# Patient Record
Sex: FEMALE
[De-identification: names, ages, dates, MRNs, and addresses within clinical notes are randomized per-mention and may not be internally consistent; named-entity substitution may affect disease eponyms.]

---

## 2017-01-12 ENCOUNTER — APPOINTMENT (OUTPATIENT)
Dept: ENDOCRINOLOGY | Facility: CLINIC | Age: 42
End: 2017-01-12

## 2017-01-12 VITALS
HEIGHT: 59 IN | OXYGEN SATURATION: 98 % | SYSTOLIC BLOOD PRESSURE: 102 MMHG | WEIGHT: 100 LBS | DIASTOLIC BLOOD PRESSURE: 64 MMHG | BODY MASS INDEX: 20.16 KG/M2 | HEART RATE: 85 BPM

## 2017-01-12 DIAGNOSIS — M79.1 MYALGIA: ICD-10-CM

## 2017-01-13 LAB
ALBUMIN SERPL ELPH-MCNC: 4.8 G/DL
ALP BLD-CCNC: 67 U/L
ALT SERPL-CCNC: 13 U/L
ANION GAP SERPL CALC-SCNC: 17 MMOL/L
AST SERPL-CCNC: 16 U/L
BILIRUB SERPL-MCNC: 0.4 MG/DL
BUN SERPL-MCNC: 15 MG/DL
CALCIUM SERPL-MCNC: 9.6 MG/DL
CHLORIDE SERPL-SCNC: 104 MMOL/L
CO2 SERPL-SCNC: 20 MMOL/L
CREAT SERPL-MCNC: 0.66 MG/DL
DSDNA AB SER-ACNC: <12 IU/ML
GLUCOSE SERPL-MCNC: 87 MG/DL
POTASSIUM SERPL-SCNC: 4.7 MMOL/L
PROT SERPL-MCNC: 8 G/DL
SODIUM SERPL-SCNC: 141 MMOL/L
T4 FREE SERPL-MCNC: 1.2 NG/DL
TSH SERPL-ACNC: 0.72 UIU/ML

## 2017-01-16 LAB
ANA PAT FLD IF-IMP: ABNORMAL
ANA SER IF-ACNC: ABNORMAL

## 2017-02-07 ENCOUNTER — MEDICATION RENEWAL (OUTPATIENT)
Age: 42
End: 2017-02-07

## 2017-03-02 ENCOUNTER — RX RENEWAL (OUTPATIENT)
Age: 42
End: 2017-03-02

## 2017-03-09 ENCOUNTER — APPOINTMENT (OUTPATIENT)
Dept: NEUROLOGY | Facility: CLINIC | Age: 42
End: 2017-03-09

## 2017-03-09 VITALS
DIASTOLIC BLOOD PRESSURE: 74 MMHG | SYSTOLIC BLOOD PRESSURE: 110 MMHG | HEIGHT: 59 IN | BODY MASS INDEX: 20.16 KG/M2 | HEART RATE: 64 BPM | WEIGHT: 100 LBS
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
## 2017-03-09 DIAGNOSIS — G43.009 MIGRAINE W/OUT AURA, NOT INTRACTABLE, W/OUT STATUS MIGRAINOSUS: ICD-10-CM

## 2017-03-09 DIAGNOSIS — M26.629 ARTHRALGIA OF TEMPOROMANDIBULAR JOINT,: ICD-10-CM
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
## 2017-03-10 PROBLEM — G43.009 COMMON MIGRAINE WITHOUT AURA: Status: ACTIVE | Noted: 2017-03-10

## 2017-03-10 PROBLEM — M26.629 TMJ TENDERNESS: Status: ACTIVE | Noted: 2017-03-10

## 2017-03-23 ENCOUNTER — APPOINTMENT (OUTPATIENT)
Dept: INTERNAL MEDICINE | Facility: CLINIC | Age: 42
End: 2017-03-23

## 2017-03-23 ENCOUNTER — LABORATORY RESULT (OUTPATIENT)
Age: 42
End: 2017-03-23

## 2017-03-23 VITALS
HEART RATE: 72 BPM | HEIGHT: 59 IN | SYSTOLIC BLOOD PRESSURE: 100 MMHG | DIASTOLIC BLOOD PRESSURE: 70 MMHG | BODY MASS INDEX: 20.16 KG/M2 | WEIGHT: 100 LBS | TEMPERATURE: 98.6 F

## 2017-03-23 DIAGNOSIS — Z83.2 FAMILY HISTORY OF DISEASES OF THE BLOOD AND BLOOD-FORMING ORGANS AND CERTAIN DISORDERS INVOLVING THE IMMUNE MECHANISM: ICD-10-CM

## 2017-03-24 LAB
25(OH)D3 SERPL-MCNC: 52.7 NG/ML
CHOLEST SERPL-MCNC: 233 MG/DL
CHOLEST/HDLC SERPL: 3.5 RATIO
HBA1C MFR BLD HPLC: 5.3 %
HDLC SERPL-MCNC: 67 MG/DL
LDLC SERPL CALC-MCNC: 155 MG/DL
TRIGL SERPL-MCNC: 53 MG/DL

## 2017-03-25 LAB
ANA PAT FLD IF-IMP: ABNORMAL
ANA SER IF-ACNC: ABNORMAL
CARDIOLIPIN AB SER IA-ACNC: NEGATIVE

## 2017-03-28 LAB — S PYO AG SPEC QL IA: NEGATIVE

## 2017-04-06 ENCOUNTER — APPOINTMENT (OUTPATIENT)
Dept: NEUROLOGY | Facility: CLINIC | Age: 42
End: 2017-04-06

## 2017-04-26 ENCOUNTER — MEDICATION RENEWAL (OUTPATIENT)
Age: 42
End: 2017-04-26

## 2017-05-20 ENCOUNTER — RX RENEWAL (OUTPATIENT)
Age: 42
End: 2017-05-20

## 2017-05-23 ENCOUNTER — RX RENEWAL (OUTPATIENT)
Age: 42
End: 2017-05-23

## 2017-06-09 ENCOUNTER — MEDICATION RENEWAL (OUTPATIENT)
Age: 42
End: 2017-06-09

## 2017-06-09 DIAGNOSIS — J30.2 OTHER SEASONAL ALLERGIC RHINITIS: ICD-10-CM

## 2017-07-13 ENCOUNTER — APPOINTMENT (OUTPATIENT)
Dept: ENDOCRINOLOGY | Facility: CLINIC | Age: 42
End: 2017-07-13

## 2017-10-05 ENCOUNTER — APPOINTMENT (OUTPATIENT)
Dept: ENDOCRINOLOGY | Facility: CLINIC | Age: 42
End: 2017-10-05
Payer: COMMERCIAL

## 2017-10-05 ENCOUNTER — LABORATORY RESULT (OUTPATIENT)
Age: 42
End: 2017-10-05

## 2017-10-05 VITALS
WEIGHT: 102 LBS | OXYGEN SATURATION: 97 % | SYSTOLIC BLOOD PRESSURE: 100 MMHG | DIASTOLIC BLOOD PRESSURE: 62 MMHG | HEART RATE: 96 BPM | BODY MASS INDEX: 20.56 KG/M2 | HEIGHT: 59 IN

## 2017-10-05 DIAGNOSIS — E05.90 THYROTOXICOSIS, UNSPECIFIED W/OUT THYROTOXIC CRISIS OR STORM: ICD-10-CM

## 2017-10-05 PROCEDURE — 99213 OFFICE O/P EST LOW 20 MIN: CPT

## 2017-10-06 LAB
THYROGLOB AB SERPL-ACNC: <20 IU/ML
THYROPEROXIDASE AB SERPL IA-ACNC: <10 IU/ML

## 2017-10-12 LAB — TSI ACT/NOR SER: 213 %

## 2018-01-16 ENCOUNTER — RX RENEWAL (OUTPATIENT)
Age: 43
End: 2018-01-16

## 2018-01-17 ENCOUNTER — RX RENEWAL (OUTPATIENT)
Age: 43
End: 2018-01-17

## 2018-01-18 ENCOUNTER — MEDICATION RENEWAL (OUTPATIENT)
Age: 43
End: 2018-01-18

## 2018-01-19 ENCOUNTER — MEDICATION RENEWAL (OUTPATIENT)
Age: 43
End: 2018-01-19

## 2018-02-14 ENCOUNTER — APPOINTMENT (OUTPATIENT)
Dept: COLORECTAL SURGERY | Facility: CLINIC | Age: 43
End: 2018-02-14
Payer: COMMERCIAL

## 2018-02-14 VITALS
DIASTOLIC BLOOD PRESSURE: 73 MMHG | BODY MASS INDEX: 20.16 KG/M2 | WEIGHT: 100 LBS | OXYGEN SATURATION: 99 % | HEIGHT: 59 IN | SYSTOLIC BLOOD PRESSURE: 106 MMHG | HEART RATE: 84 BPM | RESPIRATION RATE: 14 BRPM

## 2018-02-14 DIAGNOSIS — K64.9 UNSPECIFIED HEMORRHOIDS: ICD-10-CM

## 2018-02-14 DIAGNOSIS — K64.1 SECOND DEGREE HEMORRHOIDS: ICD-10-CM

## 2018-02-14 DIAGNOSIS — K64.0 FIRST DEGREE HEMORRHOIDS: ICD-10-CM

## 2018-02-14 PROCEDURE — 46600 DIAGNOSTIC ANOSCOPY SPX: CPT

## 2018-02-14 PROCEDURE — 99243 OFF/OP CNSLTJ NEW/EST LOW 30: CPT | Mod: 25

## 2018-02-14 RX ORDER — MONTELUKAST 10 MG/1
10 TABLET, FILM COATED ORAL
Qty: 90 | Refills: 0 | Status: DISCONTINUED | COMMUNITY
Start: 2017-06-09 | End: 2018-02-14

## 2018-02-14 RX ORDER — AZITHROMYCIN 250 MG/1
250 TABLET, FILM COATED ORAL
Qty: 6 | Refills: 0 | Status: DISCONTINUED | COMMUNITY
Start: 2017-03-25

## 2018-02-14 RX ORDER — DICLOFENAC SODIUM 50 MG/1
50 TABLET, DELAYED RELEASE ORAL
Qty: 20 | Refills: 0 | Status: DISCONTINUED | COMMUNITY
Start: 2018-01-19 | End: 2018-02-14

## 2018-02-14 RX ORDER — HYOSCYAMINE SULFATE 0.12 MG/1
0.12 TABLET, ORALLY DISINTEGRATING ORAL
Qty: 30 | Refills: 0 | Status: DISCONTINUED | COMMUNITY
Start: 2017-07-31

## 2018-02-14 RX ORDER — DICLOFENAC SODIUM 10 MG/G
1 GEL TOPICAL
Qty: 3 | Refills: 2 | Status: DISCONTINUED | COMMUNITY
Start: 2018-01-18 | End: 2018-02-14

## 2018-02-14 RX ORDER — HYOSCYAMINE SULFATE 0.12 MG/1
0.12 TABLET SUBLINGUAL 4 TIMES DAILY
Qty: 15 | Refills: 0 | Status: DISCONTINUED | COMMUNITY
End: 2018-02-14

## 2018-03-28 ENCOUNTER — APPOINTMENT (OUTPATIENT)
Dept: INTERNAL MEDICINE | Facility: CLINIC | Age: 43
End: 2018-03-28
Payer: COMMERCIAL

## 2018-03-28 VITALS
HEIGHT: 59 IN | DIASTOLIC BLOOD PRESSURE: 64 MMHG | SYSTOLIC BLOOD PRESSURE: 100 MMHG | HEART RATE: 78 BPM | WEIGHT: 98 LBS | BODY MASS INDEX: 19.76 KG/M2 | OXYGEN SATURATION: 98 % | TEMPERATURE: 98.2 F

## 2018-03-28 DIAGNOSIS — Z86.39 PERSONAL HISTORY OF OTHER ENDOCRINE, NUTRITIONAL AND METABOLIC DISEASE: ICD-10-CM

## 2018-03-28 DIAGNOSIS — E03.8 OTHER SPECIFIED HYPOTHYROIDISM: ICD-10-CM
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
## 2018-03-28 PROCEDURE — 99396 PREV VISIT EST AGE 40-64: CPT | Mod: 25

## 2018-03-28 PROCEDURE — 36415 COLL VENOUS BLD VENIPUNCTURE: CPT

## 2018-03-29 ENCOUNTER — MEDICATION RENEWAL (OUTPATIENT)
Age: 43
End: 2018-03-29

## 2018-03-29 LAB
ALBUMIN SERPL ELPH-MCNC: 4.8 G/DL
ALP BLD-CCNC: 67 U/L
ALT SERPL-CCNC: 23 U/L
ANION GAP SERPL CALC-SCNC: 14 MMOL/L
AST SERPL-CCNC: 28 U/L
BASOPHILS # BLD AUTO: 0.01 K/UL
BASOPHILS NFR BLD AUTO: 0.2 %
BILIRUB SERPL-MCNC: 0.6 MG/DL
BUN SERPL-MCNC: 13 MG/DL
CALCIUM SERPL-MCNC: 9.5 MG/DL
CHLORIDE SERPL-SCNC: 103 MMOL/L
CHOLEST SERPL-MCNC: 264 MG/DL
CHOLEST/HDLC SERPL: 4 RATIO
CO2 SERPL-SCNC: 23 MMOL/L
CREAT SERPL-MCNC: 0.76 MG/DL
EOSINOPHIL # BLD AUTO: 0.07 K/UL
EOSINOPHIL NFR BLD AUTO: 1.2 %
GLUCOSE SERPL-MCNC: 79 MG/DL
HBA1C MFR BLD HPLC: 5.2 %
HCT VFR BLD CALC: 43.7 %
HDLC SERPL-MCNC: 66 MG/DL
HGB BLD-MCNC: 14 G/DL
IMM GRANULOCYTES NFR BLD AUTO: 0.2 %
LDLC SERPL CALC-MCNC: 176 MG/DL
LYMPHOCYTES # BLD AUTO: 2.2 K/UL
LYMPHOCYTES NFR BLD AUTO: 36.8 %
MAN DIFF?: NORMAL
MCHC RBC-ENTMCNC: 29 PG
MCHC RBC-ENTMCNC: 32 GM/DL
MCV RBC AUTO: 90.5 FL
MONOCYTES # BLD AUTO: 0.4 K/UL
MONOCYTES NFR BLD AUTO: 6.7 %
NEUTROPHILS # BLD AUTO: 3.29 K/UL
NEUTROPHILS NFR BLD AUTO: 54.9 %
PLATELET # BLD AUTO: 313 K/UL
POTASSIUM SERPL-SCNC: 4.7 MMOL/L
PROT SERPL-MCNC: 7.9 G/DL
RBC # BLD: 4.83 M/UL
RBC # FLD: 12.2 %
SODIUM SERPL-SCNC: 140 MMOL/L
TRIGL SERPL-MCNC: 109 MG/DL
TSH SERPL-ACNC: 0.66 UIU/ML
WBC # FLD AUTO: 5.98 K/UL

## 2018-03-30 ENCOUNTER — MEDICATION RENEWAL (OUTPATIENT)
Age: 43
End: 2018-03-30

## 2018-04-16 ENCOUNTER — MEDICATION RENEWAL (OUTPATIENT)
Age: 43
End: 2018-04-16

## 2018-05-25 ENCOUNTER — MEDICATION RENEWAL (OUTPATIENT)
Age: 43
End: 2018-05-25

## 2018-05-29 ENCOUNTER — RX RENEWAL (OUTPATIENT)
Age: 43
End: 2018-05-29

## 2018-09-07 ENCOUNTER — RX RENEWAL (OUTPATIENT)
Age: 43
End: 2018-09-07

## 2018-10-11 ENCOUNTER — RX RENEWAL (OUTPATIENT)
Age: 43
End: 2018-10-11

## 2018-11-20 ENCOUNTER — RX RENEWAL (OUTPATIENT)
Age: 43
End: 2018-11-20

## 2018-12-26 ENCOUNTER — RX RENEWAL (OUTPATIENT)
Age: 43
End: 2018-12-26

## 2019-03-27 ENCOUNTER — RX RENEWAL (OUTPATIENT)
Age: 44
End: 2019-03-27

## 2019-03-27 ENCOUNTER — MEDICATION RENEWAL (OUTPATIENT)
Age: 44
End: 2019-03-27

## 2019-03-28 ENCOUNTER — OTHER (OUTPATIENT)
Age: 44
End: 2019-03-28

## 2019-04-10 ENCOUNTER — APPOINTMENT (OUTPATIENT)
Dept: INTERNAL MEDICINE | Facility: CLINIC | Age: 44
End: 2019-04-10
Payer: COMMERCIAL

## 2019-04-10 VITALS
BODY MASS INDEX: 19.96 KG/M2 | WEIGHT: 99 LBS | OXYGEN SATURATION: 98 % | HEIGHT: 59 IN | DIASTOLIC BLOOD PRESSURE: 78 MMHG | TEMPERATURE: 97.9 F | SYSTOLIC BLOOD PRESSURE: 104 MMHG | HEART RATE: 91 BPM

## 2019-04-10 PROCEDURE — 99396 PREV VISIT EST AGE 40-64: CPT | Mod: 25

## 2019-04-10 PROCEDURE — 36415 COLL VENOUS BLD VENIPUNCTURE: CPT

## 2019-04-10 NOTE — ASSESSMENT
[FreeTextEntry1] : # CPE \par \par - diet / exercise discussed \par - check lipid and BG \par - Needs breast US , PAP scheduled \par - UTD Tdap\par \par # migraines \par - ctr current management \par \par # GERD \par - controlled \par - takes prn PPI \par - Follows w/ GI - Dr Perkins \par \par # myalgia \par - check ESR TSH CK \par \par $ Graves Disease \par - check TSH / free t4 \par

## 2019-04-10 NOTE — PHYSICAL EXAM
[No Acute Distress] : no acute distress [Well Nourished] : well nourished [Well Developed] : well developed [Well-Appearing] : well-appearing [Normal Sclera/Conjunctiva] : normal sclera/conjunctiva [PERRL] : pupils equal round and reactive to light [EOMI] : extraocular movements intact [Normal Outer Ear/Nose] : the outer ears and nose were normal in appearance [Normal Oropharynx] : the oropharynx was normal [No JVD] : no jugular venous distention [Supple] : supple [No Lymphadenopathy] : no lymphadenopathy [No Respiratory Distress] : no respiratory distress  [Clear to Auscultation] : lungs were clear to auscultation bilaterally [No Accessory Muscle Use] : no accessory muscle use [Normal Rate] : normal rate  [Regular Rhythm] : with a regular rhythm [Normal S1, S2] : normal S1 and S2 [No Varicosities] : no varicosities [No Edema] : there was no peripheral edema [No Extremity Clubbing/Cyanosis] : no extremity clubbing/cyanosis [Soft] : abdomen soft [Non Tender] : non-tender [No HSM] : no HSM [Normal Bowel Sounds] : normal bowel sounds [Normal Supraclavicular Nodes] : no supraclavicular lymphadenopathy [Normal Posterior Cervical Nodes] : no posterior cervical lymphadenopathy [Normal Anterior Cervical Nodes] : no anterior cervical lymphadenopathy [No CVA Tenderness] : no CVA  tenderness [No Spinal Tenderness] : no spinal tenderness [No Joint Swelling] : no joint swelling [Grossly Normal Strength/Tone] : grossly normal strength/tone [No Rash] : no rash [No Skin Lesions] : no skin lesions [Normal Gait] : normal gait [Coordination Grossly Intact] : coordination grossly intact [No Focal Deficits] : no focal deficits [Speech Grossly Normal] : speech grossly normal [Memory Grossly Normal] : memory grossly normal [Normal Mood] : the mood was normal [Normal Insight/Judgement] : insight and judgment were intact

## 2019-04-10 NOTE — HISTORY OF PRESENT ILLNESS
[FreeTextEntry1] : CPE \par \par  ch neck pain - she reports 2 remote injuries , no weakness in the arms\par she has migraines that seem to be increasing in severity - maxalt ODT works better \par \par (+) stress \par \par h/o Grave's - not on any meds , follows with endo - Dr Rooney \par would like to see rheum bc myalgias and fatigue

## 2019-04-10 NOTE — HEALTH RISK ASSESSMENT
[Good] : ~his/her~  mood as  good [] : No [0] : 2) Feeling down, depressed, or hopeless: Not at all (0) [de-identified] : soc [de-identified] : no  [Change in mental status noted] : No change in mental status noted [de-identified] : regular  [None] : None [With Family] : lives with family [] :  [Sexually Active] : sexually active [Employed] : employed [Feels Safe at Home] : Feels safe at home [Reports changes in vision] : Reports changes in vision [Reports changes in dental health] : Reports changes in dental health [Carbon Monoxide Detector] : carbon monoxide detector [Smoke Detector] : smoke detector [MammogramDate] : 2019 [Safety elements used in home] : safety elements used in home [Seat Belt] :  uses seat belt [PapSmearDate] : due-

## 2019-04-11 LAB
25(OH)D3 SERPL-MCNC: 71.6 NG/ML
ALBUMIN SERPL ELPH-MCNC: 5.1 G/DL
ALP BLD-CCNC: 68 U/L
ALT SERPL-CCNC: 21 U/L
ANION GAP SERPL CALC-SCNC: 13 MMOL/L
AST SERPL-CCNC: 24 U/L
BASOPHILS # BLD AUTO: 0.03 K/UL
BASOPHILS NFR BLD AUTO: 0.4 %
BILIRUB SERPL-MCNC: 0.2 MG/DL
BUN SERPL-MCNC: 17 MG/DL
CALCIUM SERPL-MCNC: 9.7 MG/DL
CHLORIDE SERPL-SCNC: 105 MMOL/L
CHOLEST SERPL-MCNC: 205 MG/DL
CHOLEST/HDLC SERPL: 3.2 RATIO
CO2 SERPL-SCNC: 22 MMOL/L
CREAT SERPL-MCNC: 0.66 MG/DL
EOSINOPHIL # BLD AUTO: 0.1 K/UL
EOSINOPHIL NFR BLD AUTO: 1.3 %
ESTIMATED AVERAGE GLUCOSE: 100 MG/DL
GLUCOSE SERPL-MCNC: 90 MG/DL
HBA1C MFR BLD HPLC: 5.1 %
HCT VFR BLD CALC: 44.7 %
HDLC SERPL-MCNC: 65 MG/DL
HGB BLD-MCNC: 14.3 G/DL
IMM GRANULOCYTES NFR BLD AUTO: 0.3 %
LDLC SERPL CALC-MCNC: 121 MG/DL
LYMPHOCYTES # BLD AUTO: 2.74 K/UL
LYMPHOCYTES NFR BLD AUTO: 36.4 %
MAN DIFF?: NORMAL
MCHC RBC-ENTMCNC: 29.1 PG
MCHC RBC-ENTMCNC: 32 GM/DL
MCV RBC AUTO: 90.9 FL
MONOCYTES # BLD AUTO: 0.46 K/UL
MONOCYTES NFR BLD AUTO: 6.1 %
NEUTROPHILS # BLD AUTO: 4.18 K/UL
NEUTROPHILS NFR BLD AUTO: 55.5 %
PLATELET # BLD AUTO: 313 K/UL
POTASSIUM SERPL-SCNC: 4.9 MMOL/L
PROT SERPL-MCNC: 8.2 G/DL
RBC # BLD: 4.92 M/UL
RBC # FLD: 11.9 %
SODIUM SERPL-SCNC: 140 MMOL/L
TRIGL SERPL-MCNC: 97 MG/DL
TSH SERPL-ACNC: 1.19 UIU/ML
WBC # FLD AUTO: 7.53 K/UL

## 2019-04-23 ENCOUNTER — MEDICATION RENEWAL (OUTPATIENT)
Age: 44
End: 2019-04-23

## 2019-05-23 ENCOUNTER — RX RENEWAL (OUTPATIENT)
Age: 44
End: 2019-05-23

## 2019-05-28 ENCOUNTER — MEDICATION RENEWAL (OUTPATIENT)
Age: 44
End: 2019-05-28

## 2019-07-12 ENCOUNTER — MEDICATION RENEWAL (OUTPATIENT)
Age: 44
End: 2019-07-12

## 2019-08-28 ENCOUNTER — RX RENEWAL (OUTPATIENT)
Age: 44
End: 2019-08-28

## 2019-08-29 ENCOUNTER — RX RENEWAL (OUTPATIENT)
Age: 44
End: 2019-08-29

## 2019-11-20 ENCOUNTER — CLINICAL ADVICE (OUTPATIENT)
Age: 44
End: 2019-11-20

## 2020-03-13 ENCOUNTER — APPOINTMENT (OUTPATIENT)
Dept: ENDOCRINOLOGY | Facility: CLINIC | Age: 45
End: 2020-03-13

## 2020-03-29 ENCOUNTER — TRANSCRIPTION ENCOUNTER (OUTPATIENT)
Age: 45
End: 2020-03-29

## 2020-03-31 ENCOUNTER — APPOINTMENT (OUTPATIENT)
Dept: DISASTER EMERGENCY | Facility: CLINIC | Age: 45
End: 2020-03-31

## 2020-04-04 ENCOUNTER — NON-APPOINTMENT (OUTPATIENT)
Age: 45
End: 2020-04-04

## 2020-04-06 DIAGNOSIS — Z20.828 CONTACT WITH AND (SUSPECTED) EXPOSURE TO OTHER VIRAL COMMUNICABLE DISEASES: ICD-10-CM

## 2020-04-14 ENCOUNTER — APPOINTMENT (OUTPATIENT)
Dept: INTERNAL MEDICINE | Facility: CLINIC | Age: 45
End: 2020-04-14

## 2020-07-22 ENCOUNTER — APPOINTMENT (OUTPATIENT)
Dept: INTERNAL MEDICINE | Facility: CLINIC | Age: 45
End: 2020-07-22
Payer: COMMERCIAL

## 2020-07-22 VITALS
DIASTOLIC BLOOD PRESSURE: 70 MMHG | SYSTOLIC BLOOD PRESSURE: 100 MMHG | TEMPERATURE: 99.1 F | HEIGHT: 59 IN | OXYGEN SATURATION: 98 % | WEIGHT: 100 LBS | HEART RATE: 97 BPM | BODY MASS INDEX: 20.16 KG/M2

## 2020-07-22 PROCEDURE — 99396 PREV VISIT EST AGE 40-64: CPT | Mod: 25

## 2020-07-22 PROCEDURE — 36415 COLL VENOUS BLD VENIPUNCTURE: CPT

## 2020-07-22 RX ORDER — PSEUDOEPHEDRINE HCL 30 MG
TABLET ORAL
Refills: 0 | Status: DISCONTINUED | COMMUNITY
End: 2020-07-22

## 2020-07-22 NOTE — HISTORY OF PRESENT ILLNESS
[FreeTextEntry1] : CPE \par \par  feels very tired and fatigued since she had the Covid infection \par she also has long standing GERD - never scoped - follows with Dr Perkins \par \par Endo - hyperthyroid - subclinical \par \par has been having  rash on the chest since she had the Covid \par itchy

## 2020-07-22 NOTE — HEALTH RISK ASSESSMENT
[] : No [No] : No [Good] : ~his/her~  mood as  good [0] : 1) Little interest or pleasure doing things: Not at all (0) [None] : None [] :  [Employed] : employed [With Family] : lives with family [Sexually Active] : sexually active [Feels Safe at Home] : Feels safe at home [Fully functional (bathing, dressing, toileting, transferring, walking, feeding)] : Fully functional (bathing, dressing, toileting, transferring, walking, feeding) [Fully functional (using the telephone, shopping, preparing meals, housekeeping, doing laundry, using] : Fully functional and needs no help or supervision to perform IADLs (using the telephone, shopping, preparing meals, housekeeping, doing laundry, using transportation, managing medications and managing finances) [Reports changes in hearing] : Reports no changes in hearing [Reports changes in vision] : Reports changes in vision [Reports changes in dental health] : Reports changes in dental health [PapSmearDate] : 2019 [MammogramDate] : 2019 [Smoke Detector] : smoke detector

## 2020-07-22 NOTE — ASSESSMENT
[FreeTextEntry1] : 1) CPE\par - diet /exercise discussed \par - check labs\par - mammo - ordered \par - UTD PAP / Tdap\par \par 2) check thyroid levels \par - US thyroid \par \par derm - has her own derm - declines referral\par \par GI f/u for ch GERD

## 2020-07-22 NOTE — REVIEW OF SYSTEMS
[Fatigue] : fatigue [Skin Rash] : skin rash [Negative] : Heme/Lymph [FreeTextEntry9] : ch neck pain

## 2020-07-23 LAB
25(OH)D3 SERPL-MCNC: 25.8 NG/ML
ALBUMIN SERPL ELPH-MCNC: 4.7 G/DL
ALP BLD-CCNC: 68 U/L
ALT SERPL-CCNC: 22 U/L
ANION GAP SERPL CALC-SCNC: 13 MMOL/L
AST SERPL-CCNC: 22 U/L
BASOPHILS # BLD AUTO: 0.05 K/UL
BASOPHILS NFR BLD AUTO: 0.6 %
BILIRUB SERPL-MCNC: 0.5 MG/DL
BUN SERPL-MCNC: 16 MG/DL
CALCIUM SERPL-MCNC: 9.6 MG/DL
CHLORIDE SERPL-SCNC: 105 MMOL/L
CHOLEST SERPL-MCNC: 213 MG/DL
CHOLEST/HDLC SERPL: 3.4 RATIO
CO2 SERPL-SCNC: 21 MMOL/L
CREAT SERPL-MCNC: 0.77 MG/DL
EOSINOPHIL # BLD AUTO: 0.08 K/UL
EOSINOPHIL NFR BLD AUTO: 0.9 %
ESTIMATED AVERAGE GLUCOSE: 100 MG/DL
GLUCOSE SERPL-MCNC: 86 MG/DL
HBA1C MFR BLD HPLC: 5.1 %
HCT VFR BLD CALC: 44.4 %
HDLC SERPL-MCNC: 63 MG/DL
HGB BLD-MCNC: 14.2 G/DL
IMM GRANULOCYTES NFR BLD AUTO: 0.1 %
LDLC SERPL CALC-MCNC: 131 MG/DL
LYMPHOCYTES # BLD AUTO: 2.19 K/UL
LYMPHOCYTES NFR BLD AUTO: 25.7 %
MAN DIFF?: NORMAL
MCHC RBC-ENTMCNC: 28.9 PG
MCHC RBC-ENTMCNC: 32 GM/DL
MCV RBC AUTO: 90.4 FL
MONOCYTES # BLD AUTO: 0.6 K/UL
MONOCYTES NFR BLD AUTO: 7 %
NEUTROPHILS # BLD AUTO: 5.59 K/UL
NEUTROPHILS NFR BLD AUTO: 65.7 %
PLATELET # BLD AUTO: 320 K/UL
POTASSIUM SERPL-SCNC: 4.5 MMOL/L
PROT SERPL-MCNC: 7.2 G/DL
RBC # BLD: 4.91 M/UL
RBC # FLD: 11.9 %
SODIUM SERPL-SCNC: 139 MMOL/L
TRIGL SERPL-MCNC: 91 MG/DL
TSH SERPL-ACNC: 1.03 UIU/ML
WBC # FLD AUTO: 8.52 K/UL

## 2020-09-15 ENCOUNTER — RX RENEWAL (OUTPATIENT)
Age: 45
End: 2020-09-15

## 2021-04-01 ENCOUNTER — APPOINTMENT (OUTPATIENT)
Dept: GASTROENTEROLOGY | Facility: CLINIC | Age: 46
End: 2021-04-01

## 2021-05-24 ENCOUNTER — NON-APPOINTMENT (OUTPATIENT)
Age: 46
End: 2021-05-24

## 2021-05-25 ENCOUNTER — RX RENEWAL (OUTPATIENT)
Age: 46
End: 2021-05-25

## 2021-05-26 ENCOUNTER — RX RENEWAL (OUTPATIENT)
Age: 46
End: 2021-05-26

## 2021-06-10 ENCOUNTER — LABORATORY RESULT (OUTPATIENT)
Age: 46
End: 2021-06-10

## 2021-06-10 ENCOUNTER — APPOINTMENT (OUTPATIENT)
Dept: GASTROENTEROLOGY | Facility: CLINIC | Age: 46
End: 2021-06-10
Payer: COMMERCIAL

## 2021-06-10 VITALS
WEIGHT: 102 LBS | SYSTOLIC BLOOD PRESSURE: 102 MMHG | BODY MASS INDEX: 20.56 KG/M2 | DIASTOLIC BLOOD PRESSURE: 68 MMHG | TEMPERATURE: 97.1 F | HEIGHT: 59 IN

## 2021-06-10 DIAGNOSIS — K58.0 IRRITABLE BOWEL SYNDROME WITH DIARRHEA: ICD-10-CM

## 2021-06-10 DIAGNOSIS — R14.0 ABDOMINAL DISTENSION (GASEOUS): ICD-10-CM

## 2021-06-10 DIAGNOSIS — K58.1 IRRITABLE BOWEL SYNDROME WITH CONSTIPATION: ICD-10-CM

## 2021-06-10 DIAGNOSIS — K21.9 GASTRO-ESOPHAGEAL REFLUX DISEASE W/OUT ESOPHAGITIS: ICD-10-CM

## 2021-06-10 PROCEDURE — 99214 OFFICE O/P EST MOD 30 MIN: CPT | Mod: 25

## 2021-06-10 PROCEDURE — 36415 COLL VENOUS BLD VENIPUNCTURE: CPT

## 2021-06-10 PROCEDURE — 99072 ADDL SUPL MATRL&STAF TM PHE: CPT

## 2021-06-10 RX ORDER — PANTOPRAZOLE 40 MG/1
40 TABLET, DELAYED RELEASE ORAL DAILY
Qty: 90 | Refills: 2 | Status: DISCONTINUED | COMMUNITY
Start: 2021-05-28 | End: 2021-06-10

## 2021-06-10 RX ORDER — MELOXICAM 7.5 MG/1
7.5 TABLET ORAL DAILY
Qty: 30 | Refills: 1 | Status: DISCONTINUED | COMMUNITY
Start: 2018-03-29 | End: 2021-06-10

## 2021-06-10 RX ORDER — LEVOCETIRIZINE DIHYDROCHLORIDE 5 MG/1
5 TABLET, FILM COATED ORAL
Refills: 0 | Status: DISCONTINUED | COMMUNITY
End: 2021-06-10

## 2021-06-10 RX ORDER — IBUPROFEN 200 MG/1
200 TABLET, FILM COATED ORAL
Refills: 0 | Status: DISCONTINUED | COMMUNITY
End: 2021-06-10

## 2021-06-10 RX ORDER — AZELASTINE HYDROCHLORIDE 137 UG/1
0.1 SPRAY, METERED NASAL TWICE DAILY
Qty: 1 | Refills: 0 | Status: DISCONTINUED | COMMUNITY
Start: 2019-07-12 | End: 2021-06-10

## 2021-06-10 RX ORDER — CLONAZEPAM 0.5 MG/1
0.5 TABLET ORAL
Qty: 30 | Refills: 0 | Status: DISCONTINUED | COMMUNITY
Start: 2017-03-23 | End: 2021-06-10

## 2021-06-10 NOTE — PHYSICAL EXAM
[General Appearance - Alert] : alert [General Appearance - In No Acute Distress] : in no acute distress [Sclera] : the sclera and conjunctiva were normal [PERRL With Normal Accommodation] : pupils were equal in size, round, and reactive to light [Extraocular Movements] : extraocular movements were intact [Outer Ear] : the ears and nose were normal in appearance [Oropharynx] : the oropharynx was normal [Neck Appearance] : the appearance of the neck was normal [Neck Cervical Mass (___cm)] : no neck mass was observed [Thyroid Diffuse Enlargement] : the thyroid was not enlarged [Jugular Venous Distention Increased] : there was no jugular-venous distention [Thyroid Nodule] : there were no palpable thyroid nodules [Auscultation Breath Sounds / Voice Sounds] : lungs were clear to auscultation bilaterally [Heart Rate And Rhythm] : heart rate was normal and rhythm regular [Heart Sounds] : normal S1 and S2 [Heart Sounds Gallop] : no gallops [Murmurs] : no murmurs [Heart Sounds Pericardial Friction Rub] : no pericardial rub [Full Pulse] : the pedal pulses are present [Edema] : there was no peripheral edema [Bowel Sounds] : normal bowel sounds [Abdomen Soft] : soft [Abdomen Tenderness] : non-tender [Abdomen Mass (___ Cm)] : no abdominal mass palpated [Cervical Lymph Nodes Enlarged Posterior Bilaterally] : posterior cervical [Cervical Lymph Nodes Enlarged Anterior Bilaterally] : anterior cervical [Supraclavicular Lymph Nodes Enlarged Bilaterally] : supraclavicular [Axillary Lymph Nodes Enlarged Bilaterally] : axillary [Femoral Lymph Nodes Enlarged Bilaterally] : femoral [Inguinal Lymph Nodes Enlarged Bilaterally] : inguinal [No CVA Tenderness] : no ~M costovertebral angle tenderness [No Spinal Tenderness] : no spinal tenderness [Abnormal Walk] : normal gait [Nail Clubbing] : no clubbing  or cyanosis of the fingernails [Musculoskeletal - Swelling] : no joint swelling seen [Motor Tone] : muscle strength and tone were normal [Skin Turgor] : normal skin turgor [Skin Color & Pigmentation] : normal skin color and pigmentation [] : no rash [Deep Tendon Reflexes (DTR)] : deep tendon reflexes were 2+ and symmetric [Sensation] : the sensory exam was normal to light touch and pinprick [No Focal Deficits] : no focal deficits [Oriented To Time, Place, And Person] : oriented to person, place, and time [Impaired Insight] : insight and judgment were intact [Affect] : the affect was normal

## 2021-06-10 NOTE — REVIEW OF SYSTEMS
[Diarrhea] : diarrhea [Negative] : Heme/Lymph [As Noted in HPI] : as noted in HPI [Arthralgias] : arthralgias [Joint Pain] : joint pain

## 2021-06-11 ENCOUNTER — RESULT REVIEW (OUTPATIENT)
Age: 46
End: 2021-06-11

## 2021-06-11 LAB
24R-OH-CALCIDIOL SERPL-MCNC: 90.8 PG/ML
25(OH)D3 SERPL-MCNC: 22.2 NG/ML
ALBUMIN SERPL ELPH-MCNC: 4.6 G/DL
ALP BLD-CCNC: 79 U/L
ALT SERPL-CCNC: 13 U/L
ANION GAP SERPL CALC-SCNC: 13 MMOL/L
AST SERPL-CCNC: 19 U/L
BILIRUB SERPL-MCNC: 0.3 MG/DL
BUN SERPL-MCNC: 16 MG/DL
CALCIUM SERPL-MCNC: 9.5 MG/DL
CHLORIDE SERPL-SCNC: 105 MMOL/L
CO2 SERPL-SCNC: 22 MMOL/L
CREAT SERPL-MCNC: 0.69 MG/DL
GLUCOSE SERPL-MCNC: 104 MG/DL
HBV SURFACE AB SER QL: REACTIVE
HBV SURFACE AG SER QL: NONREACTIVE
HCV AB SER QL: NONREACTIVE
HCV S/CO RATIO: 0.2 S/CO
HEPATITIS A IGG ANTIBODY: REACTIVE
POTASSIUM SERPL-SCNC: 4.6 MMOL/L
PROT SERPL-MCNC: 7.6 G/DL
SODIUM SERPL-SCNC: 140 MMOL/L
TSH SERPL-ACNC: 0.87 UIU/ML
VIT B12 SERPL-MCNC: 738 PG/ML

## 2021-06-14 LAB — CELIACPAN: NORMAL

## 2021-06-30 ENCOUNTER — NON-APPOINTMENT (OUTPATIENT)
Age: 46
End: 2021-06-30

## 2021-07-06 PROBLEM — K58.1 IRRITABLE BOWEL SYNDROME WITH CONSTIPATION: Status: RESOLVED | Noted: 2021-03-03 | Resolved: 2021-07-06

## 2021-07-06 PROBLEM — K58.0 IRRITABLE BOWEL SYNDROME WITH DIARRHEA: Status: RESOLVED | Noted: 2021-06-30 | Resolved: 2021-07-06

## 2021-07-06 PROBLEM — K58.0 IRRITABLE BOWEL SYNDROME WITH DIARRHEA: Status: ACTIVE | Noted: 2021-07-06

## 2021-07-06 NOTE — HISTORY OF PRESENT ILLNESS
[FreeTextEntry1] : Patient is a 45-year-old female with irritable bowel syndrome and cervical spine pain self-referred for recent looser bowel movements without any nocturnal symptoms.  She has self diagnosed lactose intolerance as milk or milk products causes diarrhea.  She uses Lactaid as needed.  She has GERD for which she uses pantoprazole and occasional Pepcid.  We discussed that in the near future we will likely proceed to endoscopy.  There is no rectal bleeding or family history colorectal cancer.  There is no known new medications.  She recently received her first COVID-19 vaccine.  No weight loss or anorexia.

## 2021-07-06 NOTE — ASSESSMENT
[FreeTextEntry1] : 45-year-old female with IBS-D with recent increase symptoms after oily foods fried foods predominantly.  There is no weight loss anorexia or rectal bleeding.  Her GERD is baseline without any dysphagia.  I advised her to continue with the Lactaid and avoiding lactose-containing products.  We will perform stool testing for fecal calprotectin, fecal elastase and PCR based gastrointestinal pathogens.  If the calprotectin is positive we will proceed to colonoscopy.  In the near future she will undergo endoscopy.  Lastly she will obtain an abdominal ultrasound at [facility].

## 2021-08-14 ENCOUNTER — APPOINTMENT (OUTPATIENT)
Dept: INTERNAL MEDICINE | Facility: CLINIC | Age: 46
End: 2021-08-14
Payer: COMMERCIAL

## 2021-08-14 VITALS
TEMPERATURE: 98.3 F | BODY MASS INDEX: 21.2 KG/M2 | SYSTOLIC BLOOD PRESSURE: 114 MMHG | OXYGEN SATURATION: 98 % | DIASTOLIC BLOOD PRESSURE: 70 MMHG | HEIGHT: 58 IN | WEIGHT: 101 LBS | HEART RATE: 72 BPM

## 2021-08-14 PROCEDURE — 99396 PREV VISIT EST AGE 40-64: CPT

## 2021-08-14 NOTE — HISTORY OF PRESENT ILLNESS
[FreeTextEntry1] : CPE\par \par feels well \par anxiety worse since she gt laid off \par would like lorazepam RF

## 2021-08-14 NOTE — HEALTH RISK ASSESSMENT
[Good] : ~his/her~  mood as  good [No] : No [0] : 2) Feeling down, depressed, or hopeless: Not at all (0) [Patient reported mammogram was normal] : Patient reported mammogram was normal [None] : None [With Family] : lives with family [Employed] : employed [] :  [Sexually Active] : sexually active [Feels Safe at Home] : Feels safe at home [Smoke Detector] : smoke detector [Carbon Monoxide Detector] : carbon monoxide detector [Safety elements used in home] : safety elements used in home [Seat Belt] :  uses seat belt [] : No [de-identified] : on and off  [de-identified] : regular  [Change in mental status noted] : No change in mental status noted [Reports changes in hearing] : Reports no changes in hearing [Reports changes in vision] : Reports no changes in vision [Reports changes in dental health] : Reports no changes in dental health [MammogramDate] : 2018 [PapSmearDate] : 2021

## 2021-08-14 NOTE — ASSESSMENT
[FreeTextEntry1] : 1) CPE\par \par - diet / exercise\par - check A1C / lipid\par - mammo - scheduled\par - PAP - UTD \par - UTD tD\par \par SMALL mole R shin- has had for many years \par adv to follow w/. derm - she goes to [facility]

## 2021-08-17 LAB
BASOPHILS # BLD AUTO: 0.05 K/UL
BASOPHILS NFR BLD AUTO: 0.7 %
CHOLEST SERPL-MCNC: 263 MG/DL
EOSINOPHIL # BLD AUTO: 0.09 K/UL
EOSINOPHIL NFR BLD AUTO: 1.2 %
ESTIMATED AVERAGE GLUCOSE: 97 MG/DL
HBA1C MFR BLD HPLC: 5 %
HCT VFR BLD CALC: 43.1 %
HDLC SERPL-MCNC: 67 MG/DL
HGB BLD-MCNC: 14.3 G/DL
IMM GRANULOCYTES NFR BLD AUTO: 0.4 %
LDLC SERPL CALC-MCNC: 183 MG/DL
LYMPHOCYTES # BLD AUTO: 2.34 K/UL
LYMPHOCYTES NFR BLD AUTO: 31.7 %
MAN DIFF?: NORMAL
MCHC RBC-ENTMCNC: 29.3 PG
MCHC RBC-ENTMCNC: 33.2 GM/DL
MCV RBC AUTO: 88.3 FL
MONOCYTES # BLD AUTO: 0.54 K/UL
MONOCYTES NFR BLD AUTO: 7.3 %
NEUTROPHILS # BLD AUTO: 4.33 K/UL
NEUTROPHILS NFR BLD AUTO: 58.7 %
NONHDLC SERPL-MCNC: 196 MG/DL
PLATELET # BLD AUTO: 339 K/UL
RBC # BLD: 4.88 M/UL
RBC # FLD: 11.8 %
TRIGL SERPL-MCNC: 62 MG/DL
WBC # FLD AUTO: 7.38 K/UL

## 2021-10-21 ENCOUNTER — APPOINTMENT (OUTPATIENT)
Dept: ULTRASOUND IMAGING | Facility: CLINIC | Age: 46
End: 2021-10-21
Payer: COMMERCIAL

## 2021-10-21 ENCOUNTER — APPOINTMENT (OUTPATIENT)
Dept: MAMMOGRAPHY | Facility: CLINIC | Age: 46
End: 2021-10-21
Payer: COMMERCIAL

## 2021-10-21 PROCEDURE — 77067 SCR MAMMO BI INCL CAD: CPT

## 2021-10-21 PROCEDURE — 76700 US EXAM ABDOM COMPLETE: CPT

## 2021-10-21 PROCEDURE — 76641 ULTRASOUND BREAST COMPLETE: CPT | Mod: 50

## 2021-10-21 PROCEDURE — 77063 BREAST TOMOSYNTHESIS BI: CPT

## 2022-01-19 RX ORDER — HYOSCYAMINE SULFATE 0.12 MG/1
0.12 TABLET, ORALLY DISINTEGRATING ORAL
Qty: 120 | Refills: 4 | Status: ACTIVE | COMMUNITY
Start: 2021-03-03 | End: 1900-01-01

## 2022-03-18 ENCOUNTER — NON-APPOINTMENT (OUTPATIENT)
Age: 47
End: 2022-03-18

## 2022-03-29 ENCOUNTER — RX RENEWAL (OUTPATIENT)
Age: 47
End: 2022-03-29

## 2022-04-28 ENCOUNTER — APPOINTMENT (OUTPATIENT)
Dept: INTERNAL MEDICINE | Facility: CLINIC | Age: 47
End: 2022-04-28
Payer: COMMERCIAL

## 2022-04-28 DIAGNOSIS — J32.9 CHRONIC SINUSITIS, UNSPECIFIED: ICD-10-CM

## 2022-04-28 PROCEDURE — 99441: CPT

## 2022-04-28 RX ORDER — RABEPRAZOLE SODIUM 20 MG/1
20 TABLET, DELAYED RELEASE ORAL
Qty: 90 | Refills: 2 | Status: DISCONTINUED | COMMUNITY
Start: 2021-08-10 | End: 2022-04-28

## 2022-04-28 RX ORDER — ESOMEPRAZOLE MAGNESIUM 40 MG/1
40 CAPSULE, DELAYED RELEASE ORAL
Qty: 90 | Refills: 1 | Status: DISCONTINUED | COMMUNITY
Start: 2021-08-17 | End: 2022-04-28

## 2022-04-28 RX ORDER — DOXYCYCLINE HYCLATE 100 MG/1
100 CAPSULE ORAL
Qty: 14 | Refills: 0 | Status: COMPLETED | COMMUNITY
Start: 2022-04-28 | End: 2022-05-05

## 2022-04-28 RX ORDER — RABEPRAZOLE SODIUM 20 MG/1
20 TABLET, DELAYED RELEASE ORAL
Qty: 30 | Refills: 2 | Status: DISCONTINUED | COMMUNITY
Start: 2021-08-13 | End: 2022-04-28

## 2022-04-28 RX ORDER — RABEPRAZOLE SODIUM 20 MG/1
20 TABLET, DELAYED RELEASE ORAL
Qty: 90 | Refills: 2 | Status: DISCONTINUED | COMMUNITY
Start: 2021-08-11 | End: 2022-04-28

## 2022-04-28 NOTE — HISTORY OF PRESENT ILLNESS
[Home] : at home, [unfilled] , at the time of the visit. [Medical Office: ([facility])___] : at the medical office located in  [FreeTextEntry8] : 46 y.o. female, PMHx hyperthyroid, IBS, TMJ, migraine headaches.  \par \par cc: Sinus infection.\par \par Sore throat, facial pain, right ear pain.  \par Green phlegm.\par Symptoms started 4 days ago.  \par Chills and hot on and off.  Body aches, weakness.  \par No GI symptoms.  \par \par COVID infection x 2.  Feels like sinuses never the same since then.  H/o lots allergies.  \par Does nasal saline rinse and nasal steroids rinse/spray prescribed by allergist. \par

## 2022-04-28 NOTE — ASSESSMENT
[FreeTextEntry1] : Sinusitis:\par PCN allergy, will treat with doxy\par Continue rinse/sprays\par Patient to call if not resolved with antibiotic treatment\par

## 2022-06-10 ENCOUNTER — NON-APPOINTMENT (OUTPATIENT)
Age: 47
End: 2022-06-10

## 2022-06-10 DIAGNOSIS — L65.9 NONSCARRING HAIR LOSS, UNSPECIFIED: ICD-10-CM

## 2022-06-17 ENCOUNTER — NON-APPOINTMENT (OUTPATIENT)
Age: 47
End: 2022-06-17

## 2022-06-17 LAB
FERRITIN SERPL-MCNC: 88 NG/ML
PROLACTIN SERPL-MCNC: 42.4 NG/ML
TESTOST SERPL-MCNC: <2.5 NG/DL
TSH SERPL-ACNC: 0.95 UIU/ML

## 2022-06-30 ENCOUNTER — APPOINTMENT (OUTPATIENT)
Dept: INTERNAL MEDICINE | Facility: CLINIC | Age: 47
End: 2022-06-30

## 2022-06-30 DIAGNOSIS — E23.7 DISORDER OF PITUITARY GLAND, UNSPECIFIED: ICD-10-CM

## 2022-06-30 PROCEDURE — 99212 OFFICE O/P EST SF 10 MIN: CPT | Mod: 95

## 2022-06-30 NOTE — HISTORY OF PRESENT ILLNESS
[Home] : at home, [unfilled] , at the time of the visit. [Medical Office: ([facility])___] : at the medical office located in  [Verbal consent obtained from patient] : the patient, [unfilled] [FreeTextEntry1] : f/u on abn labs\par \par the pt had requested labs bc hair loss \par her labs showed a mildly elevated Prolactin and low testosterone \par she reports that her periods have been a little irregular \par pregnancy test - neg \par she has ongoing headaches but is not sure if there has been a change in her HA pattern \par she does think that she has more nausea w/ the HA\par \par (+) breast discharge- milky \par \par no  loss of peripheral vision / no diplopia\par

## 2022-06-30 NOTE — PHYSICAL EXAM
[Normal] : no acute distress, well nourished, well developed and well-appearing [No Respiratory Distress] : no respiratory distress  [No Rash] : no rash

## 2022-06-30 NOTE — ASSESSMENT
[FreeTextEntry1] : 1) Poss pituitary lesion\par - high prolactin and low testosterone \par - will order FSH / LH / TSH \par - MRI Of pituitary gland\par - endo f/u

## 2022-07-14 LAB
FSH SERPL-MCNC: 15.3 IU/L
LH SERPL-ACNC: 9.4 IU/L
TSH SERPL-ACNC: 1.36 UIU/ML

## 2022-07-21 ENCOUNTER — APPOINTMENT (OUTPATIENT)
Dept: INTERNAL MEDICINE | Facility: CLINIC | Age: 47
End: 2022-07-21

## 2022-07-24 ENCOUNTER — NON-APPOINTMENT (OUTPATIENT)
Age: 47
End: 2022-07-24

## 2022-07-26 ENCOUNTER — APPOINTMENT (OUTPATIENT)
Dept: INTERNAL MEDICINE | Facility: CLINIC | Age: 47
End: 2022-07-26

## 2022-07-27 ENCOUNTER — APPOINTMENT (OUTPATIENT)
Dept: ENDOCRINOLOGY | Facility: CLINIC | Age: 47
End: 2022-07-27

## 2022-07-27 VITALS
SYSTOLIC BLOOD PRESSURE: 121 MMHG | DIASTOLIC BLOOD PRESSURE: 80 MMHG | OXYGEN SATURATION: 96 % | HEART RATE: 75 BPM | WEIGHT: 102 LBS | TEMPERATURE: 97.6 F | BODY MASS INDEX: 21.41 KG/M2 | HEIGHT: 58 IN

## 2022-07-27 VITALS
OXYGEN SATURATION: 99 % | HEART RATE: 101 BPM | WEIGHT: 102 LBS | SYSTOLIC BLOOD PRESSURE: 111 MMHG | TEMPERATURE: 98.1 F | BODY MASS INDEX: 21.41 KG/M2 | HEIGHT: 58 IN | DIASTOLIC BLOOD PRESSURE: 78 MMHG

## 2022-07-27 DIAGNOSIS — E04.2 NONTOXIC MULTINODULAR GOITER: ICD-10-CM

## 2022-07-27 PROCEDURE — 36415 COLL VENOUS BLD VENIPUNCTURE: CPT

## 2022-07-27 PROCEDURE — 99203 OFFICE O/P NEW LOW 30 MIN: CPT | Mod: 25

## 2022-07-27 RX ORDER — COVID-19 MOLECULAR TEST ASSAY
KIT MISCELLANEOUS
Qty: 8 | Refills: 0 | Status: ACTIVE | COMMUNITY
Start: 2022-03-31

## 2022-07-27 RX ORDER — FAMOTIDINE 40 MG/1
40 TABLET, FILM COATED ORAL
Qty: 90 | Refills: 0 | Status: COMPLETED | COMMUNITY
Start: 2021-06-29 | End: 2022-07-27

## 2022-07-27 RX ORDER — ELUXADOLINE 100 MG/1
100 TABLET, FILM COATED ORAL TWICE DAILY
Qty: 60 | Refills: 1 | Status: COMPLETED | COMMUNITY
Start: 2021-06-30 | End: 2022-07-27

## 2022-07-27 RX ORDER — DICYCLOMINE HYDROCHLORIDE 10 MG/1
10 CAPSULE ORAL 3 TIMES DAILY
Qty: 270 | Refills: 3 | Status: COMPLETED | COMMUNITY
Start: 2021-05-28 | End: 2022-07-27

## 2022-07-27 RX ORDER — HYDROCORTISONE 2.5% 25 MG/G
2.5 CREAM TOPICAL
Qty: 60 | Refills: 3 | Status: COMPLETED | COMMUNITY
Start: 2018-02-14 | End: 2022-07-27

## 2022-07-27 RX ORDER — DICYCLOMINE HYDROCHLORIDE 20 MG/1
20 TABLET ORAL
Qty: 90 | Refills: 4 | Status: COMPLETED | COMMUNITY
Start: 2022-01-19 | End: 2022-07-27

## 2022-07-27 RX ORDER — CHLORDIAZEPOXIDE HYDROCHLORIDE AND CLIDINIUM BROMIDE 5; 2.5 MG/1; MG/1
5-2.5 CAPSULE, GELATIN COATED ORAL 3 TIMES DAILY
Qty: 90 | Refills: 0 | Status: COMPLETED | COMMUNITY
Start: 2021-05-26 | End: 2022-07-27

## 2022-07-27 RX ORDER — RIFAXIMIN 550 MG/1
550 TABLET ORAL
Qty: 42 | Refills: 2 | Status: COMPLETED | COMMUNITY
Start: 2021-07-13 | End: 2022-07-27

## 2022-07-28 LAB — PROLACTIN SERPL-MCNC: 61.9 NG/ML

## 2022-07-29 RX ORDER — ALOSETRON 0.5 MG/1
0.5 TABLET ORAL TWICE DAILY
Qty: 60 | Refills: 3 | Status: ACTIVE | COMMUNITY
Start: 2022-07-29 | End: 1900-01-01

## 2022-08-01 PROBLEM — E04.2 MULTIPLE THYROID NODULES: Status: RESOLVED | Noted: 2022-08-01 | Resolved: 2022-08-01

## 2022-08-01 NOTE — REVIEW OF SYSTEMS
[All other systems negative] : All other systems negative [FreeTextEntry7] : IBS-D [FreeTextEntry8] : Galactorrhea

## 2022-08-01 NOTE — REASON FOR VISIT
[Follow - Up] : a follow-up visit [Pituitary Evaluation/ Disorder] : pituitary evaluation/disorder [FreeTextEntry2] : Dr Tucker

## 2022-08-01 NOTE — ASSESSMENT
[FreeTextEntry1] : This is a 46-year-old female with a history of Graves' disease, thyroid nodules, elevated testosterone level, hyperprolactinemia, herniated disks, IBS-D, hyperlipidemia, vitamin D insufficiency, positive TIMO, migraines, here for consultation.\par She reports that she noticed possible galactorrhea at home and had a prolactin level checked by PCP.  Prolactin found to be 42.4.  TSH normal.  Check repeat prolactin.  Check macroprolactin.\par MRI of the pituitary was ordered however reports exam is expensive.\par She was on methimazole in the past for hyperthyroidism due to Graves' disease.  She is clinically euthyroid.\par Check thyroid ultrasound for possible right thyroid nodule.

## 2022-08-01 NOTE — PHYSICAL EXAM
[Alert] : alert [Well Nourished] : well nourished [Healthy Appearance] : healthy appearance [No Acute Distress] : no acute distress [Well Developed] : well developed [Normal Voice/Communication] : normal voice communication [Normal Sclera/Conjunctiva] : normal sclera/conjunctiva [No Proptosis] : no proptosis [Supple] : the neck was supple [No Respiratory Distress] : no respiratory distress [Normal Affect] : the affect was normal [Normal Insight/Judgement] : insight and judgment were intact [Normal Mood] : the mood was normal [de-identified] : Possible right thyroid nodule

## 2022-08-01 NOTE — HISTORY OF PRESENT ILLNESS
[FreeTextEntry1] : CC: High prolactin\par This is a 46-year-old female with a history of Graves' disease, elevated testosterone level, hyperprolactinemia, herniated disks, IBS-D, hyperlipidemia, vitamin D insufficiency, positive TIMO, migraines, here for consultation.\par She reports that she noticed possible galactorrhea at home and had a prolactin level checked by PCP.  Prolactin found to be 42.4.  TSH normal.\par She reports irregular menstrual periods and hair loss.\par MRI of the pituitary was ordered however reports exam is expensive.\par She was on methimazole in the past for hyperthyroidism due to Graves' disease.\par

## 2022-08-03 ENCOUNTER — NON-APPOINTMENT (OUTPATIENT)
Age: 47
End: 2022-08-03

## 2022-08-04 LAB
TESTOST FREE SERPL-MCNC: 0.7 PG/ML
TESTOST SERPL-MCNC: 11.9 NG/DL

## 2022-08-05 ENCOUNTER — NON-APPOINTMENT (OUTPATIENT)
Age: 47
End: 2022-08-05

## 2022-09-06 ENCOUNTER — NON-APPOINTMENT (OUTPATIENT)
Age: 47
End: 2022-09-06

## 2022-09-06 LAB
MONOMERIC PROLACTIN (ICMA)*: 50.2 NG/ML
PERCENT MACROPROLACTIN: 15 %
PROLACTIN, SERUM (ICMA)*: 59.2 NG/ML

## 2022-09-14 ENCOUNTER — RX RENEWAL (OUTPATIENT)
Age: 47
End: 2022-09-14

## 2022-09-15 ENCOUNTER — OUTPATIENT (OUTPATIENT)
Dept: OUTPATIENT SERVICES | Facility: HOSPITAL | Age: 47
LOS: 1 days | End: 2022-09-15
Payer: COMMERCIAL

## 2022-09-15 ENCOUNTER — APPOINTMENT (OUTPATIENT)
Dept: INTERNAL MEDICINE | Facility: CLINIC | Age: 47
End: 2022-09-15

## 2022-09-15 ENCOUNTER — APPOINTMENT (OUTPATIENT)
Dept: MRI IMAGING | Facility: CLINIC | Age: 47
End: 2022-09-15

## 2022-09-15 VITALS
SYSTOLIC BLOOD PRESSURE: 106 MMHG | HEART RATE: 112 BPM | BODY MASS INDEX: 21.41 KG/M2 | DIASTOLIC BLOOD PRESSURE: 73 MMHG | WEIGHT: 102 LBS | TEMPERATURE: 98.1 F | OXYGEN SATURATION: 97 % | HEIGHT: 58 IN

## 2022-09-15 DIAGNOSIS — G44.229 CHRONIC TENSION-TYPE HEADACHE, NOT INTRACTABLE: ICD-10-CM

## 2022-09-15 DIAGNOSIS — Z00.8 ENCOUNTER FOR OTHER GENERAL EXAMINATION: ICD-10-CM

## 2022-09-15 DIAGNOSIS — E23.7 DISORDER OF PITUITARY GLAND, UNSPECIFIED: ICD-10-CM

## 2022-09-15 PROCEDURE — 99396 PREV VISIT EST AGE 40-64: CPT

## 2022-09-15 PROCEDURE — 70553 MRI BRAIN STEM W/O & W/DYE: CPT

## 2022-09-15 PROCEDURE — 70553 MRI BRAIN STEM W/O & W/DYE: CPT | Mod: 26

## 2022-09-15 PROCEDURE — A9585: CPT

## 2022-09-15 NOTE — HEALTH RISK ASSESSMENT
[Good] : ~his/her~  mood as  good [Never] : Never [No] : No [0] : 2) Feeling down, depressed, or hopeless: Not at all (0) [de-identified] : none [de-identified] : regular  [Patient reported mammogram was normal] : Patient reported mammogram was normal [Patient reported PAP Smear was normal] : Patient reported PAP Smear was normal [Change in mental status noted] : No change in mental status noted [None] : None [With Significant Other] : lives with significant other [With Family] : lives with family [Employed] : employed [Significant Other] : lives with significant other [Sexually Active] : sexually active [Feels Safe at Home] : Feels safe at home [Reports changes in hearing] : Reports no changes in hearing [Reports changes in vision] : Reports no changes in vision [Reports changes in dental health] : Reports no changes in dental health [Smoke Detector] : smoke detector [Carbon Monoxide Detector] : carbon monoxide detector [Safety elements used in home] : safety elements used in home [Seat Belt] :  uses seat belt [MammogramDate] : 2021 [MammogramComments] : at gyn [PapSmearDate] : 2021 [PapSmearComments] : gyn

## 2022-09-15 NOTE — ASSESSMENT
[FreeTextEntry1] : 1) CPE\par \par - diet / exercise discussed \par - check labs\par - mammo/ pap ---through gyn\par - refuses Tdap today\par \par 2) Ch neck pain \par - ct current  management \par \par 3) endo f/u

## 2022-09-15 NOTE — HISTORY OF PRESENT ILLNESS
[FreeTextEntry1] : CPE\par \par \par  pt has again been having irregular periods \par she has seen endo \par scheduled MRI pituitary gland\par \par headaches better

## 2022-09-16 LAB
25(OH)D3 SERPL-MCNC: 26.6 NG/ML
ALBUMIN SERPL ELPH-MCNC: 5 G/DL
ALP BLD-CCNC: 82 U/L
ALT SERPL-CCNC: 11 U/L
ANION GAP SERPL CALC-SCNC: 12 MMOL/L
AST SERPL-CCNC: 19 U/L
BASOPHILS # BLD AUTO: 0.03 K/UL
BASOPHILS NFR BLD AUTO: 0.4 %
BILIRUB SERPL-MCNC: 0.5 MG/DL
BUN SERPL-MCNC: 13 MG/DL
CALCIUM SERPL-MCNC: 9.9 MG/DL
CHLORIDE SERPL-SCNC: 108 MMOL/L
CHOLEST SERPL-MCNC: 235 MG/DL
CO2 SERPL-SCNC: 20 MMOL/L
CREAT SERPL-MCNC: 0.64 MG/DL
EGFR: 112 ML/MIN/1.73M2
EOSINOPHIL # BLD AUTO: 0.06 K/UL
EOSINOPHIL NFR BLD AUTO: 0.8 %
ESTIMATED AVERAGE GLUCOSE: 105 MG/DL
GLUCOSE SERPL-MCNC: 91 MG/DL
HBA1C MFR BLD HPLC: 5.3 %
HCT VFR BLD CALC: 43.6 %
HDLC SERPL-MCNC: 57 MG/DL
HGB BLD-MCNC: 14 G/DL
IMM GRANULOCYTES NFR BLD AUTO: 0.3 %
LDLC SERPL CALC-MCNC: 162 MG/DL
LYMPHOCYTES # BLD AUTO: 2.08 K/UL
LYMPHOCYTES NFR BLD AUTO: 29 %
MAN DIFF?: NORMAL
MCHC RBC-ENTMCNC: 29.2 PG
MCHC RBC-ENTMCNC: 32.1 GM/DL
MCV RBC AUTO: 90.8 FL
MONOCYTES # BLD AUTO: 0.42 K/UL
MONOCYTES NFR BLD AUTO: 5.8 %
NEUTROPHILS # BLD AUTO: 4.57 K/UL
NEUTROPHILS NFR BLD AUTO: 63.7 %
NONHDLC SERPL-MCNC: 178 MG/DL
PLATELET # BLD AUTO: 345 K/UL
POTASSIUM SERPL-SCNC: 4.4 MMOL/L
PROT SERPL-MCNC: 7.5 G/DL
RBC # BLD: 4.8 M/UL
RBC # FLD: 11.7 %
SODIUM SERPL-SCNC: 140 MMOL/L
TRIGL SERPL-MCNC: 79 MG/DL
TSH SERPL-ACNC: 1.3 UIU/ML
WBC # FLD AUTO: 7.18 K/UL

## 2022-09-19 ENCOUNTER — NON-APPOINTMENT (OUTPATIENT)
Age: 47
End: 2022-09-19

## 2022-10-13 ENCOUNTER — APPOINTMENT (OUTPATIENT)
Dept: INTERNAL MEDICINE | Facility: CLINIC | Age: 47
End: 2022-10-13

## 2022-10-13 PROCEDURE — 36415 COLL VENOUS BLD VENIPUNCTURE: CPT

## 2022-10-17 LAB — PROLACTIN SERPL-MCNC: 9.4 NG/ML

## 2022-10-27 ENCOUNTER — OUTPATIENT (OUTPATIENT)
Dept: OUTPATIENT SERVICES | Facility: HOSPITAL | Age: 47
LOS: 1 days | End: 2022-10-27
Payer: COMMERCIAL

## 2022-10-27 ENCOUNTER — RESULT REVIEW (OUTPATIENT)
Age: 47
End: 2022-10-27

## 2022-10-27 ENCOUNTER — APPOINTMENT (OUTPATIENT)
Dept: ULTRASOUND IMAGING | Facility: IMAGING CENTER | Age: 47
End: 2022-10-27

## 2022-10-27 ENCOUNTER — APPOINTMENT (OUTPATIENT)
Dept: MAMMOGRAPHY | Facility: IMAGING CENTER | Age: 47
End: 2022-10-27

## 2022-10-27 DIAGNOSIS — E05.90 THYROTOXICOSIS, UNSPECIFIED WITHOUT THYROTOXIC CRISIS OR STORM: ICD-10-CM

## 2022-10-27 DIAGNOSIS — Z00.00 ENCOUNTER FOR GENERAL ADULT MEDICAL EXAMINATION WITHOUT ABNORMAL FINDINGS: ICD-10-CM

## 2022-10-27 DIAGNOSIS — Z00.8 ENCOUNTER FOR OTHER GENERAL EXAMINATION: ICD-10-CM

## 2022-10-27 PROCEDURE — 76536 US EXAM OF HEAD AND NECK: CPT | Mod: 26

## 2022-10-27 PROCEDURE — 76641 ULTRASOUND BREAST COMPLETE: CPT | Mod: 26,50

## 2022-10-27 PROCEDURE — 77067 SCR MAMMO BI INCL CAD: CPT | Mod: 26

## 2022-10-27 PROCEDURE — 77063 BREAST TOMOSYNTHESIS BI: CPT | Mod: 26

## 2022-10-27 PROCEDURE — 76641 ULTRASOUND BREAST COMPLETE: CPT

## 2022-10-27 PROCEDURE — 77067 SCR MAMMO BI INCL CAD: CPT

## 2022-10-27 PROCEDURE — 76536 US EXAM OF HEAD AND NECK: CPT

## 2022-10-27 PROCEDURE — 77063 BREAST TOMOSYNTHESIS BI: CPT

## 2022-11-02 ENCOUNTER — NON-APPOINTMENT (OUTPATIENT)
Age: 47
End: 2022-11-02

## 2023-01-17 ENCOUNTER — NON-APPOINTMENT (OUTPATIENT)
Age: 48
End: 2023-01-17

## 2023-02-02 ENCOUNTER — RX RENEWAL (OUTPATIENT)
Age: 48
End: 2023-02-02

## 2023-02-16 ENCOUNTER — APPOINTMENT (OUTPATIENT)
Dept: ENDOCRINOLOGY | Facility: CLINIC | Age: 48
End: 2023-02-16
Payer: COMMERCIAL

## 2023-02-16 VITALS
OXYGEN SATURATION: 98 % | SYSTOLIC BLOOD PRESSURE: 106 MMHG | HEIGHT: 58 IN | BODY MASS INDEX: 21.62 KG/M2 | TEMPERATURE: 97.9 F | DIASTOLIC BLOOD PRESSURE: 68 MMHG | WEIGHT: 103 LBS | RESPIRATION RATE: 14 BRPM | HEART RATE: 102 BPM

## 2023-02-16 DIAGNOSIS — E04.1 NONTOXIC SINGLE THYROID NODULE: ICD-10-CM

## 2023-02-16 DIAGNOSIS — E05.00 THYROTOXICOSIS WITH DIFFUSE GOITER W/OUT THYROTOXIC CRISIS OR STORM: ICD-10-CM

## 2023-02-16 PROCEDURE — 36415 COLL VENOUS BLD VENIPUNCTURE: CPT

## 2023-02-16 PROCEDURE — 99214 OFFICE O/P EST MOD 30 MIN: CPT | Mod: 25

## 2023-02-19 DIAGNOSIS — E27.49 OTHER ADRENOCORTICAL INSUFFICIENCY: ICD-10-CM

## 2023-02-19 PROBLEM — E04.1 THYROID CYST: Status: ACTIVE | Noted: 2022-11-02

## 2023-02-19 LAB
ACTH SER-ACNC: <1.5 PG/ML
ALBUMIN SERPL ELPH-MCNC: 4.5 G/DL
ALP BLD-CCNC: 69 U/L
ALT SERPL-CCNC: 20 U/L
ANION GAP SERPL CALC-SCNC: 11 MMOL/L
AST SERPL-CCNC: 24 U/L
BILIRUB SERPL-MCNC: 0.4 MG/DL
BUN SERPL-MCNC: 11 MG/DL
CALCIUM SERPL-MCNC: 9.1 MG/DL
CHLORIDE SERPL-SCNC: 106 MMOL/L
CO2 SERPL-SCNC: 22 MMOL/L
CORTIS SERPL-MCNC: 4.9 UG/DL
CREAT SERPL-MCNC: 0.61 MG/DL
EGFR: 112 ML/MIN/1.73M2
FSH SERPL-MCNC: 13.5 IU/L
GLUCOSE SERPL-MCNC: 99 MG/DL
IGF-1 INTERP: NORMAL
IGF-I BLD-MCNC: 208 NG/ML
LH SERPL-ACNC: 12 IU/L
POTASSIUM SERPL-SCNC: 4.4 MMOL/L
PROLACTIN SERPL-MCNC: 4.4 NG/ML
PROT SERPL-MCNC: 7.1 G/DL
SODIUM SERPL-SCNC: 139 MMOL/L
T4 FREE SERPL-MCNC: 1.3 NG/DL
TSH SERPL-ACNC: 1.07 UIU/ML

## 2023-02-19 NOTE — HISTORY OF PRESENT ILLNESS
[FreeTextEntry1] : CC: High prolactin\par This is a 47-year-old female with a history of Graves' disease, hyperprolactinemia, herniated disks, IBS-D, hyperlipidemia, pituitary microadenoma, bilateral thyroid cysts, vitamin D insufficiency, positive TIMO, migraines, here for follow-up.\par She reports that she noticed possible galactorrhea at home and had a prolactin level checked by PCP.  Prolactin found to be 42.4. Repeat prolactin 61.9. Macroprolactin normal. TSH normal.\par She was started on cabergoline 0.5 mg 2 days/week.  Reports she ran out of cabergoline approximately 1 and half weeks ago.\par She reports irregular menstrual periods.\par MRI of the pituitary from September 2022 showed left pituitary 6.7 mm pituitary microadenoma. \par LMP 2/4/2023\par She was on methimazole in the past for hyperthyroidism due to Graves' disease.\par Reports occasional blurry vision and headaches (headaches are chronic).  Denies galactorrhea.\par

## 2023-02-19 NOTE — ASSESSMENT
[FreeTextEntry1] : This is a 47-year-old female with a history of Graves' disease, thyroid nodules, hyperprolactinemia, herniated disks, IBS-D, hyperlipidemia, pituitary microadenoma, bilateral thyroid cysts, vitamin D insufficiency, positive TIMO, migraines, here for follow-up.\par She reports that she noticed possible galactorrhea at home and had a prolactin level checked by PCP.  Prolactin found to be 42.4. Repeat prolactin 61.9. Macroprolactin normal. TSH normal.  She was started on cabergoline 0.5 mg 2 days/week.  She ran out of cabergoline approximately 1-1/2 weeks ago.  Check prolactin.  Check anterior pituitary hormones.\par She was on methimazole in the past for hyperthyroidism due to Graves' disease.  She is clinically euthyroid.\par Check TFTs.\par

## 2023-02-19 NOTE — PHYSICAL EXAM
[Alert] : alert [Well Nourished] : well nourished [Healthy Appearance] : healthy appearance [No Acute Distress] : no acute distress [Well Developed] : well developed [Normal Voice/Communication] : normal voice communication [Normal Sclera/Conjunctiva] : normal sclera/conjunctiva [No Proptosis] : no proptosis [Supple] : the neck was supple [No Respiratory Distress] : no respiratory distress [Normal Affect] : the affect was normal [Normal Insight/Judgement] : insight and judgment were intact [Normal Mood] : the mood was normal [Thyroid Not Enlarged] : the thyroid was not enlarged

## 2023-02-19 NOTE — ADDENDUM
[FreeTextEntry1] : By signing my name below, I, Antwan Roman, attest that this document has been prepared under the direction and in the presence of Dr. García.\par \par I, Sanchez García MD, personally performed the services described in this documentation. All medical record entries made by the scribe were at my discretion and in my presence. I have reviewed the chart and discharge instructions (if applicable) and agree that the record reflects my personal permanence and is accurate and complete.\par

## 2023-02-19 NOTE — REVIEW OF SYSTEMS
[All other systems negative] : All other systems negative [Blurred Vision] : blurred vision [Irregular Menses] : irregular menses [Headaches] : headaches [FreeTextEntry7] : IBS-D

## 2023-02-23 ENCOUNTER — NON-APPOINTMENT (OUTPATIENT)
Age: 48
End: 2023-02-23

## 2023-02-28 ENCOUNTER — NON-APPOINTMENT (OUTPATIENT)
Age: 48
End: 2023-02-28

## 2023-02-28 LAB
CORTIS SERPL-MCNC: 11.6 UG/DL
ESTRADIOL SERPL-MCNC: 60 PG/ML
MONOMERIC PROLACTIN (ICMA)*: 3.76 NG/ML
PERCENT MACROPROLACTIN: 16 %
PROLACTIN, SERUM (ICMA)*: 4.47 NG/ML

## 2023-03-01 LAB
ACTH SER-ACNC: 4 PG/ML
HCG SERPL-MCNC: <1 MIU/ML

## 2023-03-30 ENCOUNTER — OUTPATIENT (OUTPATIENT)
Dept: OUTPATIENT SERVICES | Facility: HOSPITAL | Age: 48
LOS: 1 days | End: 2023-03-30
Payer: COMMERCIAL

## 2023-03-30 ENCOUNTER — APPOINTMENT (OUTPATIENT)
Dept: MRI IMAGING | Facility: CLINIC | Age: 48
End: 2023-03-30
Payer: COMMERCIAL

## 2023-03-30 DIAGNOSIS — D35.2 BENIGN NEOPLASM OF PITUITARY GLAND: ICD-10-CM

## 2023-03-30 DIAGNOSIS — E22.1 HYPERPROLACTINEMIA: ICD-10-CM

## 2023-03-30 PROCEDURE — A9585: CPT

## 2023-03-30 PROCEDURE — 70553 MRI BRAIN STEM W/O & W/DYE: CPT | Mod: 26

## 2023-03-30 PROCEDURE — 70553 MRI BRAIN STEM W/O & W/DYE: CPT

## 2023-04-11 DIAGNOSIS — G43.909 MIGRAINE, UNSPECIFIED, NOT INTRACTABLE, W/OUT STATUS MIGRAINOSUS: ICD-10-CM

## 2023-04-11 DIAGNOSIS — G44.229 CHRONIC TENSION-TYPE HEADACHE, NOT INTRACTABLE: ICD-10-CM

## 2023-04-12 ENCOUNTER — NON-APPOINTMENT (OUTPATIENT)
Age: 48
End: 2023-04-12

## 2023-05-15 RX ORDER — PANTOPRAZOLE 40 MG/1
40 TABLET, DELAYED RELEASE ORAL DAILY
Qty: 90 | Refills: 3 | Status: ACTIVE | COMMUNITY
Start: 2018-04-16 | End: 1900-01-01

## 2023-07-21 RX ORDER — HYOSCYAMINE SULFATE 0.12 MG/1
0.12 TABLET, ORALLY DISINTEGRATING ORAL
Qty: 180 | Refills: 0 | Status: ACTIVE | COMMUNITY
Start: 2023-07-21 | End: 1900-01-01

## 2023-08-09 ENCOUNTER — APPOINTMENT (OUTPATIENT)
Dept: PAIN MANAGEMENT | Facility: CLINIC | Age: 48
End: 2023-08-09

## 2023-09-08 RX ORDER — DICYCLOMINE HYDROCHLORIDE 10 MG/1
10 CAPSULE ORAL 3 TIMES DAILY
Qty: 270 | Refills: 2 | Status: ACTIVE | COMMUNITY
Start: 2023-09-08 | End: 1900-01-01

## 2023-09-20 ENCOUNTER — NON-APPOINTMENT (OUTPATIENT)
Age: 48
End: 2023-09-20

## 2023-09-21 ENCOUNTER — APPOINTMENT (OUTPATIENT)
Dept: INTERNAL MEDICINE | Facility: CLINIC | Age: 48
End: 2023-09-21
Payer: COMMERCIAL

## 2023-09-21 VITALS
TEMPERATURE: 98 F | OXYGEN SATURATION: 97 % | SYSTOLIC BLOOD PRESSURE: 111 MMHG | RESPIRATION RATE: 15 BRPM | HEIGHT: 58 IN | DIASTOLIC BLOOD PRESSURE: 77 MMHG | BODY MASS INDEX: 21.41 KG/M2 | HEART RATE: 104 BPM | WEIGHT: 102 LBS

## 2023-09-21 DIAGNOSIS — Z00.00 ENCOUNTER FOR GENERAL ADULT MEDICAL EXAMINATION W/OUT ABNORMAL FINDINGS: ICD-10-CM

## 2023-09-21 DIAGNOSIS — M54.2 CERVICALGIA: ICD-10-CM

## 2023-09-21 DIAGNOSIS — Z23 ENCOUNTER FOR IMMUNIZATION: ICD-10-CM

## 2023-09-21 DIAGNOSIS — F41.9 ANXIETY DISORDER, UNSPECIFIED: ICD-10-CM

## 2023-09-21 PROCEDURE — 99396 PREV VISIT EST AGE 40-64: CPT

## 2023-09-21 RX ORDER — CHLORDIAZEPOXIDE HYDROCHLORIDE AND CLIDINIUM BROMIDE 5; 2.5 MG/1; MG/1
5-2.5 CAPSULE, GELATIN COATED ORAL 3 TIMES DAILY
Qty: 90 | Refills: 2 | Status: DISCONTINUED | COMMUNITY
Start: 2022-07-06 | End: 2023-09-21

## 2023-09-22 LAB
ALBUMIN SERPL ELPH-MCNC: 4.7 G/DL
ALP BLD-CCNC: 72 U/L
ALT SERPL-CCNC: 13 U/L
ANION GAP SERPL CALC-SCNC: 11 MMOL/L
AST SERPL-CCNC: 17 U/L
BILIRUB SERPL-MCNC: 0.4 MG/DL
BUN SERPL-MCNC: 16 MG/DL
CALCIUM SERPL-MCNC: 9.5 MG/DL
CHLORIDE SERPL-SCNC: 106 MMOL/L
CHOLEST SERPL-MCNC: 231 MG/DL
CO2 SERPL-SCNC: 24 MMOL/L
CREAT SERPL-MCNC: 0.66 MG/DL
EGFR: 110 ML/MIN/1.73M2
ESTIMATED AVERAGE GLUCOSE: 103 MG/DL
GLUCOSE SERPL-MCNC: 95 MG/DL
HBA1C MFR BLD HPLC: 5.2 %
HCT VFR BLD CALC: 42.4 %
HDLC SERPL-MCNC: 58 MG/DL
HGB BLD-MCNC: 13.6 G/DL
LDLC SERPL CALC-MCNC: 151 MG/DL
MCHC RBC-ENTMCNC: 29.9 PG
MCHC RBC-ENTMCNC: 32.1 GM/DL
MCV RBC AUTO: 93.2 FL
NONHDLC SERPL-MCNC: 173 MG/DL
PLATELET # BLD AUTO: 297 K/UL
POTASSIUM SERPL-SCNC: 4.3 MMOL/L
PROT SERPL-MCNC: 7.2 G/DL
RBC # BLD: 4.55 M/UL
RBC # FLD: 12.1 %
SODIUM SERPL-SCNC: 141 MMOL/L
TRIGL SERPL-MCNC: 125 MG/DL
WBC # FLD AUTO: 9.52 K/UL

## 2023-09-26 ENCOUNTER — RX RENEWAL (OUTPATIENT)
Age: 48
End: 2023-09-26

## 2023-09-27 ENCOUNTER — RX RENEWAL (OUTPATIENT)
Age: 48
End: 2023-09-27

## 2023-09-29 RX ORDER — CABERGOLINE 0.5 MG/1
0.5 TABLET ORAL
Qty: 24 | Refills: 0 | Status: COMPLETED | COMMUNITY
Start: 2023-01-03 | End: 2024-03-13

## 2023-12-13 ENCOUNTER — RX RENEWAL (OUTPATIENT)
Age: 48
End: 2023-12-13

## 2023-12-13 RX ORDER — GABAPENTIN 300 MG/1
300 CAPSULE ORAL
Qty: 90 | Refills: 1 | Status: ACTIVE | COMMUNITY
Start: 2017-03-23 | End: 1900-01-01

## 2023-12-29 ENCOUNTER — APPOINTMENT (OUTPATIENT)
Dept: INTERNAL MEDICINE | Facility: CLINIC | Age: 48
End: 2023-12-29
Payer: COMMERCIAL

## 2023-12-29 PROCEDURE — 99213 OFFICE O/P EST LOW 20 MIN: CPT | Mod: 95

## 2023-12-29 RX ORDER — FLUCONAZOLE 150 MG/1
150 TABLET ORAL
Qty: 3 | Refills: 2 | Status: ACTIVE | COMMUNITY
Start: 2023-12-29 | End: 1900-01-01

## 2023-12-29 RX ORDER — LEVOFLOXACIN 500 MG/1
500 TABLET, FILM COATED ORAL DAILY
Qty: 7 | Refills: 0 | Status: ACTIVE | COMMUNITY
Start: 2023-12-29 | End: 1900-01-01

## 2023-12-29 NOTE — HISTORY OF PRESENT ILLNESS
[Home] : at home, [unfilled] , at the time of the visit. [Medical Office: ([facility])___] : at the medical office located in  [Verbal consent obtained from patient] : the patient, [unfilled] [FreeTextEntry1] : sinus infection [de-identified] : 49yo F with hx of hyperthyroidism seen for acute TEB visit for sinus infection kast visit for annual several months ago in past 6-7 days developed sinus pain above eyes and on cheeks, bilaterally, initially above the RIGHT eye and then progressed to both sides noticed a change in her voice about 5 days ago also noted change in her mucous tried sudafed and xyzal test for covid19 and was negative A: Sinus infection with PCN allergy  P: Levofloxacin 500mg once daily for 7 d      prescribed fluconazole prescribed in case of yeast infection

## 2024-01-18 ENCOUNTER — APPOINTMENT (OUTPATIENT)
Dept: INTERNAL MEDICINE | Facility: CLINIC | Age: 49
End: 2024-01-18
Payer: COMMERCIAL

## 2024-01-18 VITALS
WEIGHT: 107 LBS | OXYGEN SATURATION: 99 % | DIASTOLIC BLOOD PRESSURE: 69 MMHG | BODY MASS INDEX: 22.36 KG/M2 | RESPIRATION RATE: 15 BRPM | HEART RATE: 94 BPM | SYSTOLIC BLOOD PRESSURE: 105 MMHG

## 2024-01-18 DIAGNOSIS — M54.16 RADICULOPATHY, LUMBAR REGION: ICD-10-CM

## 2024-01-18 DIAGNOSIS — M54.12 RADICULOPATHY, CERVICAL REGION: ICD-10-CM

## 2024-01-18 PROCEDURE — 99213 OFFICE O/P EST LOW 20 MIN: CPT

## 2024-01-18 RX ORDER — TIZANIDINE 2 MG/1
2 TABLET ORAL
Qty: 90 | Refills: 0 | Status: ACTIVE | COMMUNITY
Start: 2024-01-18 | End: 1900-01-01

## 2024-01-18 NOTE — ASSESSMENT
[FreeTextEntry1] : Cervical radiculitis: Will try tizanidine in place of cyclobenzaprine as no longer seems to help.  Refill provided for clonazepam for muscle tension/anxiety - 60 tabs last filled 4 months ago.

## 2024-01-18 NOTE — HISTORY OF PRESENT ILLNESS
[FreeTextEntry8] : 48 y.o. female, PMHs IBS with diarrhea, pituitary adenoma, adrenal insufficiency, headaches, GERD, anxiety, hyperthyroid, cervical radiculopathy.   Presents for medication refill - clonazepam for muscle spasming in neck and back, headaches TMJ.   Worse on work days (pharmacist).  Muscle relaxant in the evenings (cyclobenzaprine), not sure it does much.  Chiropractic care about every other week.   Also TENS/heat/stretching at home.     Accident many years ago with herniated discs.

## 2024-01-23 ENCOUNTER — RX RENEWAL (OUTPATIENT)
Age: 49
End: 2024-01-23

## 2024-02-01 ENCOUNTER — APPOINTMENT (OUTPATIENT)
Dept: ENDOCRINOLOGY | Facility: CLINIC | Age: 49
End: 2024-02-01

## 2024-02-14 NOTE — PHYSICAL EXAM
[Alert] : alert [Well Nourished] : well nourished [Healthy Appearance] : healthy appearance [No Acute Distress] : no acute distress [Well Developed] : well developed [Normal Voice/Communication] : normal voice communication [Normal Sclera/Conjunctiva] : normal sclera/conjunctiva [No Proptosis] : no proptosis [Supple] : the neck was supple [Thyroid Not Enlarged] : the thyroid was not enlarged [No Respiratory Distress] : no respiratory distress [Normal Affect] : the affect was normal [Normal Insight/Judgement] : insight and judgment were intact [Normal Mood] : the mood was normal

## 2024-02-15 ENCOUNTER — APPOINTMENT (OUTPATIENT)
Dept: ENDOCRINOLOGY | Facility: CLINIC | Age: 49
End: 2024-02-15
Payer: COMMERCIAL

## 2024-02-15 VITALS
HEART RATE: 84 BPM | DIASTOLIC BLOOD PRESSURE: 70 MMHG | SYSTOLIC BLOOD PRESSURE: 107 MMHG | HEIGHT: 58 IN | TEMPERATURE: 97.5 F | BODY MASS INDEX: 22.46 KG/M2 | WEIGHT: 107 LBS | OXYGEN SATURATION: 99 %

## 2024-02-15 DIAGNOSIS — D35.2 BENIGN NEOPLASM OF PITUITARY GLAND: ICD-10-CM

## 2024-02-15 DIAGNOSIS — E22.1 HYPERPROLACTINEMIA: ICD-10-CM

## 2024-02-15 PROCEDURE — 36415 COLL VENOUS BLD VENIPUNCTURE: CPT

## 2024-02-15 PROCEDURE — 99214 OFFICE O/P EST MOD 30 MIN: CPT | Mod: 25

## 2024-02-15 NOTE — ADDENDUM
[FreeTextEntry1] :  By signing my name below, I, Dewayne Herrera, attest that this document has been prepared under the direction and in the presence of Dr. García.  I, Sanchez García MD, personally performed the services described in this documentation. All medical record entries made by the scribe were at my discretion and in my presence. I have reviewed the chart and discharge instructions (if applicable) and agree that the record reflects my personal permanence and is accurate and complete.

## 2024-02-15 NOTE — REVIEW OF SYSTEMS
[Blurred Vision] : blurred vision [Irregular Menses] : irregular menses [Headaches] : headaches [All other systems negative] : All other systems negative [FreeTextEntry7] : IBS-D

## 2024-02-15 NOTE — ASSESSMENT
[FreeTextEntry1] : This is a 48-year-old female with a history of Graves' disease, thyroid nodules, hyperprolactinemia, herniated disks, IBS-D, hyperlipidemia, pituitary microadenoma, bilateral thyroid cysts, vitamin D insufficiency, positive TIMO, migraines, here for follow-up. She reports that she noticed possible galactorrhea at home and had a prolactin level checked by PCP.  Prolactin found to be 42.4. Repeat prolactin 61.9. Macroprolactin normal. TSH normal. She was started on cabergoline 0.5 mg 2 days/week in September 2022. Check prolactin. Check anterior pituitary hormones. Check MRI pituitary in April 2024 (Patient would like to complete it at a later time due to high deductible)  She was on methimazole in the past for hyperthyroidism due to Graves' disease. She is clinically euthyroid. Check TFTs.

## 2024-02-15 NOTE — HISTORY OF PRESENT ILLNESS
[FreeTextEntry1] : CC: High prolactin This is a 48-year-old female with a history of Graves' disease, hyperprolactinemia, herniated disks, IBS-D, hyperlipidemia, pituitary microadenoma, bilateral thyroid cysts, vitamin D insufficiency, positive TIMO, migraines, here for follow-up. She reports that she noticed possible galactorrhea at home and had a prolactin level checked by PCP. Prolactin found to be 42.4. Repeat prolactin 61.9. Macroprolactin normal. TSH normal. She was started on cabergoline 0.5 mg (Wednesdays and Sundays)  in September 2022.  MRI of the pituitary from September 2022 showed left pituitary 6.7 mm pituitary microadenoma.  MRI from March 2023 showed stable 0.6 cm x 0.4 cm pituitary micro adenoma.  She was on methimazole in the past for hyperthyroidism due to Graves' disease. She denies blurry vision. Reports tension headaches (headaches are chronic). Denies galactorrhea. LMP: 01/22/2024

## 2024-02-27 ENCOUNTER — NON-APPOINTMENT (OUTPATIENT)
Age: 49
End: 2024-02-27

## 2024-02-27 LAB
ALBUMIN SERPL ELPH-MCNC: 4.6 G/DL
ALP BLD-CCNC: 75 U/L
ALT SERPL-CCNC: 14 U/L
ANION GAP SERPL CALC-SCNC: 13 MMOL/L
AST SERPL-CCNC: 19 U/L
BILIRUB SERPL-MCNC: 0.4 MG/DL
BUN SERPL-MCNC: 17 MG/DL
CALCIUM SERPL-MCNC: 9.1 MG/DL
CHLORIDE SERPL-SCNC: 107 MMOL/L
CO2 SERPL-SCNC: 22 MMOL/L
CORTIS SERPL-MCNC: 5.7 UG/DL
CREAT SERPL-MCNC: 0.8 MG/DL
EGFR: 92 ML/MIN/1.73M2
ESTRADIOL SERPL-MCNC: 218 PG/ML
FSH SERPL-MCNC: 3.8 IU/L
GLUCOSE SERPL-MCNC: 101 MG/DL
IGF-1 INTERP: NORMAL
IGF-I BLD-MCNC: 185 NG/ML
LH SERPL-ACNC: 6.7 IU/L
POTASSIUM SERPL-SCNC: 4.9 MMOL/L
PROLACTIN SERPL-MCNC: 2.9 NG/ML
PROT SERPL-MCNC: 7.6 G/DL
SODIUM SERPL-SCNC: 143 MMOL/L
T4 FREE SERPL-MCNC: 1.2 NG/DL
TSH SERPL-ACNC: 1.52 UIU/ML

## 2024-03-05 RX ORDER — CABERGOLINE 0.5 MG/1
0.5 TABLET ORAL
Qty: 12 | Refills: 2 | Status: ACTIVE | COMMUNITY
Start: 2022-09-19 | End: 2024-11-26

## 2024-05-17 RX ORDER — HYOSCYAMINE SULFATE 0.12 MG/1
0.12 TABLET SUBLINGUAL
Qty: 180 | Refills: 3 | Status: ACTIVE | COMMUNITY
Start: 2024-05-17 | End: 1900-01-01

## 2024-05-17 RX ORDER — PANTOPRAZOLE 40 MG/1
40 TABLET, DELAYED RELEASE ORAL
Qty: 90 | Refills: 3 | Status: ACTIVE | COMMUNITY
Start: 2024-05-17 | End: 1900-01-01

## 2024-06-05 RX ORDER — RIZATRIPTAN BENZOATE 10 MG/1
10 TABLET, ORALLY DISINTEGRATING ORAL
Qty: 15 | Refills: 0 | Status: ACTIVE | COMMUNITY
Start: 2019-03-28 | End: 1900-01-01

## 2024-06-13 ENCOUNTER — APPOINTMENT (OUTPATIENT)
Dept: ULTRASOUND IMAGING | Facility: IMAGING CENTER | Age: 49
End: 2024-06-13

## 2024-06-13 ENCOUNTER — APPOINTMENT (OUTPATIENT)
Dept: MAMMOGRAPHY | Facility: CLINIC | Age: 49
End: 2024-06-13

## 2024-06-13 ENCOUNTER — APPOINTMENT (OUTPATIENT)
Dept: ULTRASOUND IMAGING | Facility: CLINIC | Age: 49
End: 2024-06-13

## 2024-06-13 ENCOUNTER — RESULT REVIEW (OUTPATIENT)
Age: 49
End: 2024-06-13

## 2024-06-13 ENCOUNTER — OUTPATIENT (OUTPATIENT)
Dept: OUTPATIENT SERVICES | Facility: HOSPITAL | Age: 49
LOS: 1 days | End: 2024-06-13
Payer: COMMERCIAL

## 2024-06-13 ENCOUNTER — APPOINTMENT (OUTPATIENT)
Dept: MAMMOGRAPHY | Facility: IMAGING CENTER | Age: 49
End: 2024-06-13

## 2024-06-13 DIAGNOSIS — Z00.8 ENCOUNTER FOR OTHER GENERAL EXAMINATION: ICD-10-CM

## 2024-06-13 PROCEDURE — 76641 ULTRASOUND BREAST COMPLETE: CPT

## 2024-06-13 PROCEDURE — 77067 SCR MAMMO BI INCL CAD: CPT

## 2024-06-13 PROCEDURE — 76641 ULTRASOUND BREAST COMPLETE: CPT | Mod: 26,50

## 2024-06-13 PROCEDURE — 77067 SCR MAMMO BI INCL CAD: CPT | Mod: 26

## 2024-06-13 PROCEDURE — 77063 BREAST TOMOSYNTHESIS BI: CPT

## 2024-06-13 PROCEDURE — 77063 BREAST TOMOSYNTHESIS BI: CPT | Mod: 26

## 2024-06-18 ENCOUNTER — RX RENEWAL (OUTPATIENT)
Age: 49
End: 2024-06-18

## 2024-07-23 DIAGNOSIS — Z00.00 ENCOUNTER FOR GENERAL ADULT MEDICAL EXAMINATION W/OUT ABNORMAL FINDINGS: ICD-10-CM

## 2024-07-27 LAB
MEV IGG FLD QL IA: 51.5 AU/ML
MEV IGG+IGM SER-IMP: POSITIVE
MUV AB SER-ACNC: POSITIVE
MUV IGG SER QL IA: 20.5 AU/ML
RUBV IGG FLD-ACNC: 6.09 INDEX
RUBV IGG SER-IMP: POSITIVE

## 2024-07-30 LAB
AMPHET UR-MCNC: NEGATIVE NG/ML
BARBITURATES UR-MCNC: NEGATIVE NG/ML
BENZODIAZ UR-MCNC: NEGATIVE NG/ML
COCAINE METAB.OTHER UR-MCNC: NEGATIVE NG/ML
CREATININE, URINE: 38.9 MG/DL
FENTANYL, URINE: NEGATIVE NG/ML
M TB IFN-G BLD-IMP: NEGATIVE
METHADONE SCREEN, UR: NEGATIVE NG/ML
OPIATES UR-MCNC: NEGATIVE NG/ML
OXYCODONE/OXYMORPHONE, URINE: NEGATIVE NG/ML
PCP UR-MCNC: NEGATIVE NG/ML
PH, URINE: 6.3
QUANTIFERON TB PLUS MITOGEN MINUS NIL: >10 IU/ML
QUANTIFERON TB PLUS NIL: 0.03 IU/ML
QUANTIFERON TB PLUS TB1 MINUS NIL: 0 IU/ML
QUANTIFERON TB PLUS TB2 MINUS NIL: 0 IU/ML
THC UR QL: NEGATIVE NG/ML

## 2024-08-01 ENCOUNTER — APPOINTMENT (OUTPATIENT)
Dept: INTERNAL MEDICINE | Facility: CLINIC | Age: 49
End: 2024-08-01
Payer: COMMERCIAL

## 2024-08-01 VITALS
HEART RATE: 95 BPM | BODY MASS INDEX: 22.46 KG/M2 | SYSTOLIC BLOOD PRESSURE: 111 MMHG | TEMPERATURE: 98.3 F | WEIGHT: 107 LBS | OXYGEN SATURATION: 98 % | HEIGHT: 58 IN | DIASTOLIC BLOOD PRESSURE: 77 MMHG

## 2024-08-01 DIAGNOSIS — E22.1 HYPERPROLACTINEMIA: ICD-10-CM

## 2024-08-01 DIAGNOSIS — E05.90 THYROTOXICOSIS, UNSPECIFIED W/OUT THYROTOXIC CRISIS OR STORM: ICD-10-CM

## 2024-08-01 DIAGNOSIS — E05.00 THYROTOXICOSIS WITH DIFFUSE GOITER W/OUT THYROTOXIC CRISIS OR STORM: ICD-10-CM

## 2024-08-01 DIAGNOSIS — E23.7 DISORDER OF PITUITARY GLAND, UNSPECIFIED: ICD-10-CM

## 2024-08-01 DIAGNOSIS — D35.2 BENIGN NEOPLASM OF PITUITARY GLAND: ICD-10-CM

## 2024-08-01 PROCEDURE — 36415 COLL VENOUS BLD VENIPUNCTURE: CPT

## 2024-08-01 PROCEDURE — 96372 THER/PROPH/DIAG INJ SC/IM: CPT

## 2024-08-01 PROCEDURE — 99214 OFFICE O/P EST MOD 30 MIN: CPT | Mod: 25

## 2024-08-01 RX ORDER — COSYNTROPIN 0.25 MG/ML
0.25 INJECTION, POWDER, LYOPHILIZED, FOR SOLUTION INTRAVENOUS
Refills: 0 | Status: COMPLETED | OUTPATIENT
Start: 2024-08-01

## 2024-08-01 RX ADMIN — COSYNTROPIN MG: 0.25 INJECTION, POWDER, LYOPHILIZED, FOR SOLUTION INTRAMUSCULAR; INTRAVENOUS at 00:00

## 2024-08-01 NOTE — HISTORY OF PRESENT ILLNESS
[FreeTextEntry1] : 48-year-old female with a history of Graves' disease, hyperprolactinemia, herniated disks, IBS-D, hyperlipidemia, pituitary microadenoma, bilateral thyroid cysts, vitamin D insufficiency, positive TIMO, migraines, here for follow-up. She reports that she noticed possible galactorrhea at home and had a prolactin level checked by PCP. Prolactin found to be 42.4, then 61.9. Macroprolactin normal. TSH normal. She was started on cabergoline 0.5 mg (Wednesdays and Sundays)  in September 2022, then decreased to 1 day/week February 2024. Pt was advised to recheck prolactin levels 4 weeks in March however patient was unable to completed blood work until July 2024.  Recent prolactin found to be 5.0, Cortisol 2.0 and ACTH 4.6.  MRI of the pituitary from September 2022 showed left pituitary 6.7 mm pituitary microadenoma.  MRI from March 2023 showed stable 0.6 cm x 0.4 cm pituitary micro adenoma.  Patient was unable to go for recommended MRI in April 2024.   She was on methimazole in the past for hyperthyroidism due to Graves' disease. She denies blurry vision. Reports tension headaches (headaches are chronic). Denies galactorrhea.

## 2024-08-01 NOTE — REVIEW OF SYSTEMS
[Fatigue] : fatigue [All other systems negative] : All other systems negative [FreeTextEntry7] : IBS-D

## 2024-08-01 NOTE — ASSESSMENT
[FreeTextEntry1] : 48-year-old female with a history of Graves' disease, hyperprolactinemia, herniated disks, IBS-D, hyperlipidemia, pituitary microadenoma, bilateral thyroid cysts, vitamin D insufficiency, positive TIMO, migraines, here for follow-up. She reports that she noticed possible galactorrhea at home and had a prolactin level checked by PCP. Prolactin found to be 42.4, then 61.9. Macroprolactin normal. TSH normal. She was started on cabergoline 0.5 mg (Wednesdays and Sundays) in September 2022, then decreased to 1 day/week February 2024. Pt was advised to recheck prolactin levels 4 weeks in March however patient was unable to completed blood work until July 2024.  Recent prolactin found to be 5.0, Cortisol 2.0 and ACTH 4.6.  MRI of the pituitary from September 2022 showed left pituitary 6.7 mm pituitary microadenoma.  MRI from March 2023 showed stable 0.6 cm x 0.4 cm pituitary micro adenoma.  Patient was unable to go for recommended MRI in April 2024 (Patient unable to complete at that time due to high deductible)  Check MRI Pituitary. Readvised pt to go for MRI for further evaluation & potential consequences of delaying further evaluation. Patient states she is traveling tomorrow and will go when she returns. Pt to check HCG prior to going for MRI.  Check prolactin. Check anterior pituitary hormones. Check adrenal antibodies. per Dr. García, if prolactin levels normal, pt to d/c Cabergoline as pt has been taking Cabergoline since Sept 2022.  ACTH Stimulation test performed in office.  Cortrosyn 0.25mg administered into left deltoid. LOT: [id number], EXP: 01/2026, NDC : 9431-9403-15  Signs of adrenal crisis reviewed with patient, ppt advised to seek immediate evaluation for N/V, hypotension, dizziness.   She was on methimazole in the past for hyperthyroidism due to Graves' disease. She is clinically euthyroid. Check TFTs.  Office visit reviewed with Dr. García

## 2024-08-06 LAB
ACTH STIM ACTH BASELINE: 8.8 PG/ML
ACTH STIM CORTISOL 30 MIN: 22.6 UG/DL
ACTH STIM CORTISOL BASELINE: 8.9 UG/DL
ACTH STIMULATION : CORTISOL 60 MIN: 27 UG/DL
ALBUMIN SERPL ELPH-MCNC: 4.8 G/DL
ALP BLD-CCNC: 80 U/L
ALT SERPL-CCNC: 15 U/L
ANION GAP SERPL CALC-SCNC: 11 MMOL/L
AST SERPL-CCNC: 21 U/L
BILIRUB SERPL-MCNC: 0.4 MG/DL
BUN SERPL-MCNC: 13 MG/DL
CALCIUM SERPL-MCNC: 9.6 MG/DL
CHLORIDE SERPL-SCNC: 107 MMOL/L
CO2 SERPL-SCNC: 23 MMOL/L
CREAT SERPL-MCNC: 0.77 MG/DL
EGFR: 96 ML/MIN/1.73M2
ESTRADIOL SERPL-MCNC: 47 PG/ML
FSH SERPL-MCNC: 9.2 IU/L
GLUCOSE SERPL-MCNC: 85 MG/DL
HCG SERPL-MCNC: <1 MIU/ML
IGF-1 INTERP: NORMAL
IGF-I BLD-MCNC: 113 NG/ML
LH SERPL-ACNC: 9.6 IU/L
POTASSIUM SERPL-SCNC: 4.9 MMOL/L
PROLACTIN SERPL-MCNC: 5.4 NG/ML
PROT SERPL-MCNC: 7.7 G/DL
SODIUM SERPL-SCNC: 141 MMOL/L
T3 SERPL-MCNC: 113 NG/DL
T4 FREE SERPL-MCNC: 1.1 NG/DL
TSH SERPL-ACNC: 1.04 UIU/ML

## 2024-08-10 LAB
MONOMERIC PROLACTIN (ICMA)*: 5.7 NG/ML
PERCENT MACROPROLACTIN: 11 %
PROLACTIN, SERUM (ICMA)*: 6.41 NG/ML

## 2024-08-15 ENCOUNTER — RESULT REVIEW (OUTPATIENT)
Age: 49
End: 2024-08-15

## 2024-09-06 ENCOUNTER — NON-APPOINTMENT (OUTPATIENT)
Age: 49
End: 2024-09-06

## 2024-09-26 ENCOUNTER — LABORATORY RESULT (OUTPATIENT)
Age: 49
End: 2024-09-26

## 2024-09-26 ENCOUNTER — APPOINTMENT (OUTPATIENT)
Dept: INTERNAL MEDICINE | Facility: CLINIC | Age: 49
End: 2024-09-26
Payer: COMMERCIAL

## 2024-09-26 VITALS
BODY MASS INDEX: 21.83 KG/M2 | OXYGEN SATURATION: 98 % | SYSTOLIC BLOOD PRESSURE: 115 MMHG | DIASTOLIC BLOOD PRESSURE: 84 MMHG | HEIGHT: 58 IN | TEMPERATURE: 98.5 F | WEIGHT: 104 LBS | HEART RATE: 91 BPM

## 2024-09-26 DIAGNOSIS — F41.9 ANXIETY DISORDER, UNSPECIFIED: ICD-10-CM

## 2024-09-26 DIAGNOSIS — Z00.00 ENCOUNTER FOR GENERAL ADULT MEDICAL EXAMINATION W/OUT ABNORMAL FINDINGS: ICD-10-CM

## 2024-09-26 DIAGNOSIS — K21.9 GASTRO-ESOPHAGEAL REFLUX DISEASE W/OUT ESOPHAGITIS: ICD-10-CM

## 2024-09-26 DIAGNOSIS — M54.12 RADICULOPATHY, CERVICAL REGION: ICD-10-CM

## 2024-09-26 DIAGNOSIS — L65.9 NONSCARRING HAIR LOSS, UNSPECIFIED: ICD-10-CM

## 2024-09-26 PROCEDURE — 99396 PREV VISIT EST AGE 40-64: CPT

## 2024-09-26 PROCEDURE — 99213 OFFICE O/P EST LOW 20 MIN: CPT | Mod: 25

## 2024-09-26 RX ORDER — SPIRONOLACTONE 25 MG/1
25 TABLET ORAL
Qty: 90 | Refills: 1 | Status: ACTIVE | COMMUNITY
Start: 2024-09-26 | End: 1900-01-01

## 2024-09-26 NOTE — ASSESSMENT
[FreeTextEntry1] : 1) CPE  - diet / exercise discussed  - sleep hygiene discussed - - check labs - mammo/ PAP- UTD  - UTD Tdap  - has had flu shot , refuse to get Covid  - CRC screen adv  2) Pt has ch GERD  - GI , F/U for possible EGD   3) Hair loss - check ferritin , Zn manganese  - trial of aldactone  4) anxiety  - pt reports that some time sshe has more difficult days and uses klonipin more - most days she hatdly uses it  - will give Rx for 120  - no RF  - check ISTOP

## 2024-09-26 NOTE — HEALTH RISK ASSESSMENT
[Good] : ~his/her~  mood as  good [No] : No [Little interest or pleasure doing things] : 1) Little interest or pleasure doing things [Feeling down, depressed, or hopeless] : 2) Feeling down, depressed, or hopeless [0] : 2) Feeling down, depressed, or hopeless: Not at all (0) [PHQ-2 Negative - No further assessment needed] : PHQ-2 Negative - No further assessment needed [de-identified] : walks- on and off  [de-identified] : regular [[id number]] : 0 [Never] : Never [Patient reported mammogram was normal] : Patient reported mammogram was normal [Patient reported PAP Smear was normal] : Patient reported PAP Smear was normal [Change in mental status noted] : No change in mental status noted [None] : None [With Family] : lives with family [Employed] : employed [] :  [Sexually Active] : sexually active [Feels Safe at Home] : Feels safe at home [Reports changes in hearing] : Reports no changes in hearing [Reports changes in vision] : Reports no changes in vision [Reports changes in dental health] : Reports no changes in dental health [Smoke Detector] : smoke detector [Carbon Monoxide Detector] : carbon monoxide detector [Safety elements used in home] : safety elements used in home [Seat Belt] :  uses seat belt [MammogramDate] : 6/24 [PapSmearDate] : 8/24 [ColonoscopyDate] : needed

## 2024-09-26 NOTE — HISTORY OF PRESENT ILLNESS
[FreeTextEntry1] : here for CPE  she ct to have GERD - takes PPI daily and adds famotidine as needed no alarm symptoms neck pain - get P/T - using the same med regimen as before -  Hyperprolactinemia - now off of cabergoline, repeat MRI pending - follows w/ endo c.o loss of hair thickness , has used Aldactone in the past and earl like to retry

## 2024-09-27 LAB
CHOLEST SERPL-MCNC: 239 MG/DL
ESTIMATED AVERAGE GLUCOSE: 103 MG/DL
FERRITIN SERPL-MCNC: 93 NG/ML
HBA1C MFR BLD HPLC: 5.2 %
HCT VFR BLD CALC: 42.5 %
HDLC SERPL-MCNC: 58 MG/DL
HGB BLD-MCNC: 13.7 G/DL
LDLC SERPL CALC-MCNC: 156 MG/DL
MCHC RBC-ENTMCNC: 29.3 PG
MCHC RBC-ENTMCNC: 32.2 GM/DL
MCV RBC AUTO: 90.8 FL
NONHDLC SERPL-MCNC: 182 MG/DL
PLATELET # BLD AUTO: 335 K/UL
RBC # BLD: 4.68 M/UL
RBC # FLD: 12 %
TRIGL SERPL-MCNC: 143 MG/DL
WBC # FLD AUTO: 4.58 K/UL

## 2024-10-02 LAB — ZINC SERPL-MCNC: 70 UG/DL

## 2024-10-14 ENCOUNTER — APPOINTMENT (OUTPATIENT)
Dept: MRI IMAGING | Facility: IMAGING CENTER | Age: 49
End: 2024-10-14
Payer: COMMERCIAL

## 2024-10-14 ENCOUNTER — OUTPATIENT (OUTPATIENT)
Dept: OUTPATIENT SERVICES | Facility: HOSPITAL | Age: 49
LOS: 1 days | End: 2024-10-14
Payer: COMMERCIAL

## 2024-10-14 DIAGNOSIS — Z00.8 ENCOUNTER FOR OTHER GENERAL EXAMINATION: ICD-10-CM

## 2024-10-14 DIAGNOSIS — D35.2 BENIGN NEOPLASM OF PITUITARY GLAND: ICD-10-CM

## 2024-10-14 DIAGNOSIS — E22.1 HYPERPROLACTINEMIA: ICD-10-CM

## 2024-10-14 PROCEDURE — 70553 MRI BRAIN STEM W/O & W/DYE: CPT

## 2024-10-14 PROCEDURE — A9585: CPT

## 2024-10-14 PROCEDURE — 70553 MRI BRAIN STEM W/O & W/DYE: CPT | Mod: 26

## 2024-10-30 ENCOUNTER — TRANSCRIPTION ENCOUNTER (OUTPATIENT)
Age: 49
End: 2024-10-30

## 2025-03-27 ENCOUNTER — APPOINTMENT (OUTPATIENT)
Dept: ENDOCRINOLOGY | Facility: CLINIC | Age: 50
End: 2025-03-27

## 2025-03-27 VITALS
TEMPERATURE: 98 F | HEIGHT: 58 IN | WEIGHT: 105 LBS | SYSTOLIC BLOOD PRESSURE: 116 MMHG | DIASTOLIC BLOOD PRESSURE: 81 MMHG | HEART RATE: 92 BPM | BODY MASS INDEX: 22.04 KG/M2 | OXYGEN SATURATION: 99 %

## 2025-03-27 DIAGNOSIS — E22.1 HYPERPROLACTINEMIA: ICD-10-CM

## 2025-03-27 DIAGNOSIS — D35.2 BENIGN NEOPLASM OF PITUITARY GLAND: ICD-10-CM

## 2025-03-27 PROCEDURE — 36415 COLL VENOUS BLD VENIPUNCTURE: CPT

## 2025-03-27 PROCEDURE — 99214 OFFICE O/P EST MOD 30 MIN: CPT

## 2025-04-02 LAB
25(OH)D3 SERPL-MCNC: 26.3 NG/ML
ACTH SER-ACNC: 10.7 PG/ML
ALBUMIN SERPL ELPH-MCNC: 5 G/DL
ALP BLD-CCNC: 91 U/L
ALT SERPL-CCNC: 13 U/L
ANION GAP SERPL CALC-SCNC: 10 MMOL/L
AST SERPL-CCNC: 21 U/L
BASOPHILS # BLD AUTO: 0.04 K/UL
BASOPHILS NFR BLD AUTO: 0.7 %
BILIRUB SERPL-MCNC: 0.4 MG/DL
BUN SERPL-MCNC: 15 MG/DL
CALCIUM SERPL-MCNC: 9.6 MG/DL
CHLORIDE SERPL-SCNC: 106 MMOL/L
CO2 SERPL-SCNC: 26 MMOL/L
CORTIS SERPL-MCNC: 7.1 UG/DL
CREAT SERPL-MCNC: 0.96 MG/DL
EGFRCR SERPLBLD CKD-EPI 2021: 73 ML/MIN/1.73M2
EOSINOPHIL # BLD AUTO: 0.04 K/UL
EOSINOPHIL NFR BLD AUTO: 0.7 %
ESTIMATED AVERAGE GLUCOSE: 105 MG/DL
ESTRADIOL SERPL-MCNC: 34 PG/ML
FSH SERPL-MCNC: 22.3 IU/L
GLUCOSE SERPL-MCNC: 91 MG/DL
HBA1C MFR BLD HPLC: 5.3 %
HCT VFR BLD CALC: 41.6 %
HGB BLD-MCNC: 13.5 G/DL
IGF-1 INTERP: NORMAL
IGF-I BLD-MCNC: 104 NG/ML
IMM GRANULOCYTES NFR BLD AUTO: 0.2 %
LH SERPL-ACNC: 10.1 IU/L
LYMPHOCYTES # BLD AUTO: 1.84 K/UL
LYMPHOCYTES NFR BLD AUTO: 33.1 %
MAN DIFF?: NORMAL
MCHC RBC-ENTMCNC: 28.8 PG
MCHC RBC-ENTMCNC: 32.5 G/DL
MCV RBC AUTO: 88.9 FL
MONOCYTES # BLD AUTO: 0.33 K/UL
MONOCYTES NFR BLD AUTO: 5.9 %
NEUTROPHILS # BLD AUTO: 3.3 K/UL
NEUTROPHILS NFR BLD AUTO: 59.4 %
PLATELET # BLD AUTO: 356 K/UL
POTASSIUM SERPL-SCNC: 4.8 MMOL/L
PROLACTIN SERPL-MCNC: 35.2 NG/ML
PROT SERPL-MCNC: 7.8 G/DL
RBC # BLD: 4.68 M/UL
RBC # FLD: 11.9 %
SODIUM SERPL-SCNC: 142 MMOL/L
T4 FREE SERPL-MCNC: 1.3 NG/DL
TSH SERPL-ACNC: 1.45 UIU/ML
WBC # FLD AUTO: 5.56 K/UL

## 2025-05-17 ENCOUNTER — RX RENEWAL (OUTPATIENT)
Age: 50
End: 2025-05-17

## 2025-06-12 ENCOUNTER — APPOINTMENT (OUTPATIENT)
Dept: INTERNAL MEDICINE | Facility: CLINIC | Age: 50
End: 2025-06-12
Payer: COMMERCIAL

## 2025-06-12 VITALS
HEIGHT: 58 IN | TEMPERATURE: 98.7 F | OXYGEN SATURATION: 99 % | WEIGHT: 108 LBS | SYSTOLIC BLOOD PRESSURE: 105 MMHG | DIASTOLIC BLOOD PRESSURE: 72 MMHG | BODY MASS INDEX: 22.67 KG/M2 | HEART RATE: 96 BPM

## 2025-06-12 PROCEDURE — 99214 OFFICE O/P EST MOD 30 MIN: CPT

## 2025-06-17 ENCOUNTER — RX RENEWAL (OUTPATIENT)
Age: 50
End: 2025-06-17

## 2025-08-08 RX ORDER — FAMOTIDINE 40 MG/1
40 TABLET, FILM COATED ORAL
Qty: 90 | Refills: 3 | Status: ACTIVE | COMMUNITY
Start: 2025-08-08 | End: 1900-01-01

## 2025-09-16 ENCOUNTER — RX RENEWAL (OUTPATIENT)
Age: 50
End: 2025-09-16